# Patient Record
Sex: MALE | Race: BLACK OR AFRICAN AMERICAN
[De-identification: names, ages, dates, MRNs, and addresses within clinical notes are randomized per-mention and may not be internally consistent; named-entity substitution may affect disease eponyms.]

---

## 2017-02-16 ENCOUNTER — HOSPITAL ENCOUNTER (INPATIENT)
Dept: HOSPITAL 62 - ER | Age: 82
LOS: 5 days | Discharge: HOME HEALTH | DRG: 193 | End: 2017-02-21
Attending: INTERNAL MEDICINE | Admitting: INTERNAL MEDICINE
Payer: MEDICARE

## 2017-02-16 DIAGNOSIS — M54.5: ICD-10-CM

## 2017-02-16 DIAGNOSIS — K57.90: ICD-10-CM

## 2017-02-16 DIAGNOSIS — M85.88: ICD-10-CM

## 2017-02-16 DIAGNOSIS — Z79.899: ICD-10-CM

## 2017-02-16 DIAGNOSIS — Z87.891: ICD-10-CM

## 2017-02-16 DIAGNOSIS — J18.1: Primary | ICD-10-CM

## 2017-02-16 DIAGNOSIS — E78.5: ICD-10-CM

## 2017-02-16 DIAGNOSIS — I50.33: ICD-10-CM

## 2017-02-16 DIAGNOSIS — E86.0: ICD-10-CM

## 2017-02-16 DIAGNOSIS — J44.9: ICD-10-CM
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
## 2017-02-16 DIAGNOSIS — K21.0: ICD-10-CM

## 2017-02-16 DIAGNOSIS — I25.2: ICD-10-CM

## 2017-02-16 DIAGNOSIS — Z95.5: ICD-10-CM

## 2017-02-16 DIAGNOSIS — E11.9: ICD-10-CM

## 2017-02-16 DIAGNOSIS — R00.0: ICD-10-CM

## 2017-02-16 DIAGNOSIS — I45.81: ICD-10-CM

## 2017-02-16 DIAGNOSIS — Z95.1: ICD-10-CM

## 2017-02-16 DIAGNOSIS — I25.10: ICD-10-CM

## 2017-02-16 DIAGNOSIS — I45.10: ICD-10-CM

## 2017-02-16 DIAGNOSIS — E78.00: ICD-10-CM

## 2017-02-16 DIAGNOSIS — K59.00: ICD-10-CM

## 2017-02-16 DIAGNOSIS — N40.0: ICD-10-CM

## 2017-02-16 DIAGNOSIS — I10: ICD-10-CM

## 2017-02-16 LAB
APPEARANCE UR: (no result)
BILIRUB UR QL STRIP: NEGATIVE
GLUCOSE UR STRIP-MCNC: NEGATIVE MG/DL
KETONES UR STRIP-MCNC: 20 MG/DL
NITRITE UR QL STRIP: NEGATIVE
PH UR STRIP: 5 [PH] (ref 5–9)
PROT UR STRIP-MCNC: 100 MG/DL
SP GR UR STRIP: 1.02
UROBILINOGEN UR-MCNC: NEGATIVE MG/DL (ref ?–2)

## 2017-02-16 PROCEDURE — 96375 TX/PRO/DX INJ NEW DRUG ADDON: CPT

## 2017-02-16 PROCEDURE — 93306 TTE W/DOPPLER COMPLETE: CPT

## 2017-02-16 PROCEDURE — 93010 ELECTROCARDIOGRAM REPORT: CPT

## 2017-02-16 PROCEDURE — 71020: CPT

## 2017-02-16 PROCEDURE — 81001 URINALYSIS AUTO W/SCOPE: CPT

## 2017-02-16 PROCEDURE — 36415 COLL VENOUS BLD VENIPUNCTURE: CPT

## 2017-02-16 PROCEDURE — G0378 HOSPITAL OBSERVATION PER HR: HCPCS

## 2017-02-16 PROCEDURE — 96374 THER/PROPH/DIAG INJ IV PUSH: CPT

## 2017-02-16 PROCEDURE — 87070 CULTURE OTHR SPECIMN AEROBIC: CPT

## 2017-02-16 PROCEDURE — 99285 EMERGENCY DEPT VISIT HI MDM: CPT

## 2017-02-16 PROCEDURE — 87205 SMEAR GRAM STAIN: CPT

## 2017-02-16 PROCEDURE — 80053 COMPREHEN METABOLIC PANEL: CPT

## 2017-02-16 PROCEDURE — 94667 MNPJ CHEST WALL 1ST: CPT

## 2017-02-16 PROCEDURE — 83690 ASSAY OF LIPASE: CPT

## 2017-02-16 PROCEDURE — 85025 COMPLETE CBC W/AUTO DIFF WBC: CPT

## 2017-02-16 PROCEDURE — S0119 ONDANSETRON 4 MG: HCPCS

## 2017-02-16 PROCEDURE — 80061 LIPID PANEL: CPT

## 2017-02-16 PROCEDURE — 80048 BASIC METABOLIC PNL TOTAL CA: CPT

## 2017-02-16 PROCEDURE — 93005 ELECTROCARDIOGRAM TRACING: CPT

## 2017-02-16 PROCEDURE — 85027 COMPLETE CBC AUTOMATED: CPT

## 2017-02-16 PROCEDURE — 94668 MNPJ CHEST WALL SBSQ: CPT

## 2017-02-16 PROCEDURE — 74022 RADEX COMPL AQT ABD SERIES: CPT

## 2017-02-16 PROCEDURE — 94799 UNLISTED PULMONARY SVC/PX: CPT

## 2017-02-16 PROCEDURE — 72110 X-RAY EXAM L-2 SPINE 4/>VWS: CPT

## 2017-02-16 PROCEDURE — 83036 HEMOGLOBIN GLYCOSYLATED A1C: CPT

## 2017-02-16 PROCEDURE — 96361 HYDRATE IV INFUSION ADD-ON: CPT

## 2017-02-16 PROCEDURE — 82962 GLUCOSE BLOOD TEST: CPT

## 2017-02-16 NOTE — ER DOCUMENT REPORT
ED Fall





<PARKER BOSCH - Last Filed: 02/17/17 04:42>





- General


Time seen by provider: 20:05


Mode of Arrival: Medic


Information source: Patient, [facility] Records


TRAVEL OUTSIDE OF THE [place] IN LAST 30 DAYS: No





<YELENA PAUL - Last Filed: 02/18/17 20:47>





- General


Chief Complaint: Fall


Stated Complaint: BACK PAIN


Notes: 


This 87-year-old male patient comes emergency by EMS after suffering a fall at 

home.  He reports he was going to the bathroom to urinate, and before he was 

able to his left leg gave out and he fell backwards landing on his back.  He 

did not hit his head.  He states he is having pain to his mid low back at the 

waist level.  It is not very uncomfortable when he lays still, but it hurts to 

try to stand and walk. (YELENA PAUL)





- Related data


Allergies/Adverse Reactions: 


 





No Known Allergies Allergy (Verified 02/17/17 05:02)


 








Home Medications: 


 Current Home Medications





Atorvastatin Calcium [Lipitor 20 mg Tablet] 20 g PO DAILY 02/17/17 [History]


Cholecalciferol (Vitamin D3) [Vitamin D3 2000 unit Tablet] 2,000 unit PO DAILY 

02/17/17 [History]


Diltiazem HCl [Diltiazem 24Hr Cd] 120 mg PO DAILY 02/17/17 [History]


Dorzolamide HCl/Timolol Maleat [Dorzolamide-Timolol Eye Drops] 1 drop OP BID 02/ 17/17 [History]


Furosemide [Lasix] 20 mg PO DAILY 02/17/17 [History]


Omeprazole 20 mg PO DAILY 02/17/17 [History]


Potassium Chloride [K-Tab ER] 20 meq PO DAILY 02/17/17 [History]


Quinapril HCl [Accupril 20 mg Tablet] 20 mg PO DAILY 02/17/17 [History]


Sitagliptin Phosphate [Januvia] 100 mg PO DAILY 02/17/17 [History]











Past Medical History





- General


Information source: Patient, [facility] Records





- Social History


Smoking Status: Former Smoker


Cigarette use (# per day): No


Chew tobacco use (# tins/day): No


Smoking Education Provided: No


Frequency of alcohol use: None


Drug Abuse: None


Occupation: retired


Lives with: Family


Family History: Reviewed & Not Pertinent





- Past Medical History


Cardiac Medical History: Reports: Hx Coronary Artery Disease, Hx 

Hypercholesterolemia, Hx Hypertension


Pulmonary Medical History: Reports: None


EENT Medical History: Reports: None


Neurological Medical History: Reports: None


Endocrine Medical History: Reports: Hx Diabetes Mellitus Type 2


Renal/ Medical History: Reports: Hx Benign Prostatic Hyperplasia


GI Medical History: Reports: Other - Diverticulosis.  Episode of diverticular 

bleed in January 2016 at another facility, and after moving to this area he was 

seen on 08/30/2016 and was transfused for this diverticular bleed.


Musculoskeltal Medical History: Reports None


Psychiatric Medical History: Reports: None


Past Surgical History: Reports: Hx Cardiac Catheterization, Hx Coronary Artery 

Bypass Graft - 5 vessel CABG in 1983, Hx Coronary Stent - 4 stents in 2005





<YELENA PAUL - Last Filed: 02/18/17 20:47>





Review of Systems





- Review of Systems


Constitutional: No symptoms reported


EENT: No symptoms reported


Cardiovascular: No symptoms reported


Respiratory: No symptoms reported


Gastrointestinal: No symptoms reported


Genitourinary: No symptoms reported


Male Genitourinary: No symptoms reported


Musculoskeletal: No symptoms reported


Skin: No symptoms reported


Hematologic/Lymphatic: No symptoms reported


Neurological/Psychological: No symptoms reported





<YELENA PAUL - Last Filed: 02/18/17 20:47>





Physical Exam





- Vital signs


Interpretation: Normal





- General


General appearance: Appears well, Alert


In distress: None





- HEENT


Head: Normocephalic, Atraumatic


Eyes: Normal


Pupils: PERRL


Neck: Normal





- Respiratory


Respiratory status: No respiratory distress


Breath sounds: Normal





- Cardiovascular


Rhythm: Regular


Heart sounds: Normal auscultation


Murmur: No





- Abdominal


Inspection: Normal


Bowel sounds: Normal


Tenderness: Nontender





- Back


Back: Tender - Patient is a little tender to palpate over the spinous processes 

about the L5-S1 level





- Extremities


General upper extremity: Normal inspection


General lower extremity: Normal inspection





- Neurological


Neuro grossly intact: Yes





- Psychological


Associated symptoms: Normal affect, Normal mood





- Skin


Skin Temperature: Warm


Skin Moisture: Dry


Skin Color: Normal





<YELENA PAUL - Last Filed: 02/18/17 20:47>





- Vital signs


Vitals: 


 











Resp BP Pulse Ox


 


 22 H  169/95 H  95 


 


 02/16/17 20:08  02/16/17 20:08  02/16/17 20:08








 (PARKER BOSCH)


 (YELENA PAUL)





Course





- Laboratory


Result Diagrams: 


 02/17/17 01:45





 02/17/17 00:42





<PARKER BOSCH - Last Filed: 02/17/17 04:42>





- Laboratory


Result Diagrams: 


 02/18/17 06:03





 02/18/17 06:03





- Diagnostic Test


Radiology reviewed: Reports reviewed - Severe osteopenia, no obvious fracture 

seen.





- Transfer of Care


Care transferred to following provider: Dr. Bosch





<YELENA PAUL - Last Filed: 02/18/17 20:47>





- Re-evaluation


Re-evalutation: 


02/17/17 00:55


Lab still unable to get blood and IV attempts were also unsuccessful.  I 

therefore did a bedside ultrasound peripheral IV in the right antecubital area.

  I place a 20-gauge one and three-quarter inch IV under ultrasound guidance 

into vein of the right antecubital area.  This was obtained on the first stick.

  Patient tolerated procedure well without complications.  Patient had flash 

with dark nonpulsatile blood.  Blood was easily withdrawn from the IV.  IVs 

easily flushed with saline.  Patient will be given some IV fluids.  I will 

order some pain medicine because of his hip pain.  I've also ordered EKG.  

Pending labs and will see patient is asymptomatic improvement with medications 

given.  Will continue closely monitor his heart rate being that he is 

tachycardic.  Patient again says that he did not pass out.  Patient says he was 

going down the steps in his left leg gave out causing the fall.  





02/17/17 02:15


Patient's back pain is much improved after the morphine.  Despite resolution of 

his pain is still tachycardic.  He is still receiving IV fluids.  Despite him 

not having much abdominal pain I will obtain a acute abdominal series because 

his recurrent vomiting and age.





02/17/17 04:12


Patient continues to say he is pain-free.  His abdominal x-ray is not 

concerning.  I did repalpate his abdomen and it is nontender.  He still has 

some nausea.  He has not vomited any further.  I again asked him about any 

history of chest pain or difficulty breathing.  Patient absolutely denies any 

chest pain or difficulty breathing.  I put all his hips through full range of 

motion.  He does not have any pain in his hips.  He has no previous chest wall 

to palpation.  No pain in his neck.  I see no evidence other evidence of 

trauma.  He still has some tachycardia despite fluid resuscitation; however, it 

is improving.  His blood pressure is also improved.  At this time I will speak 

with Dr. Biswas, his primary care physician, for consideration for admission.





02/17/17 04:42


I did speak with Dr. Dumont.  At first resident think this was his patient.  I 

did go back and reconfirmed the patient's son that the patient is his patient.  

They last saw him in the office to 3 months ago.  I also looked at the 

medication bottles again and confirmed that the medications did have his name 

on them as a prescriber.  I informed Dr. Biswas that I feel the patient should 

be admitted due to recurrent vomiting and a non-gap metabolic acidosis and 

continue tachycardia.  Dr. Biswas eventually agreed to admit the patient. (

PARKER BOSCH)


02/16/17 23:04


The patient's x-rays do not show any fracture.  He remains a little tachycardic 

with a heart rate of 115 and blood pressure elevated 180 systolic.


Patient states he did take his blood pressure medications, but he thinks his 

pressure is high because he has not eaten today.  When asked why he had not 

eaten, he reveals that he been vomiting earlier today.


He also reports that he does have a walker at home, but was not using it when 

he went to the bathroom and his leg gave out and he fell.


About an hour after I initially saw the patient, he complained to the nurses 

about feeling a little nauseous and was given some Zofran.





Lab was unable to draw blood from the patient.  Will have the nurses attempt to 

get an IV for further workup of this gentleman.








 (YELENA PAUL)





- Vital Signs


Vital signs: 


 











Temp Pulse Resp BP Pulse Ox


 


 98.7 F   70   24 H  161/69 H  97 


 


 02/18/17 17:06  02/18/17 19:00  02/18/17 17:06  02/18/17 17:06  02/18/17 17:06








 (PARKER BOSCH)


 (YELENA PAUL)





- Laboratory


Laboratory results interpreted by me: 


 











  02/16/17 02/17/17 02/17/17





  21:31 00:42 00:42


 


WBC   


 


RBC   


 


Hgb   


 


Hct   


 


MCV   


 


MCH   


 


RDW   


 


Seg Neuts % (Manual)   


 


Band Neutrophils %   


 


Lymphocytes %   


 


Lymphocytes % (Manual)   


 


Monocytes %   


 


Abs Neuts (Manual)   


 


Absolute Monocytes   


 


Sodium   145.2 H 


 


Chloride   110 H 


 


Carbon Dioxide   18 L 


 


BUN   25 H 


 


Glucose   218 H 


 


POC Glucose   


 


Hemoglobin A1c %   


 


Total Protein   8.5 H 


 


Albumin   


 


Cholesterol   


 


Lipase    < 10.0 L


 


Urine Protein  100 H  


 


Urine Ketones  20 H  


 


Urine Blood  SMALL H  














  02/17/17 02/17/17 02/17/17





  01:45 08:25 08:25


 


WBC  11.6 H  13.5 H 


 


RBC   


 


Hgb  11.6 L  12.2 L 


 


Hct  35.3 L  37.1 L 


 


MCV  79 L  79 L 


 


MCH  26.0 L  26.0 L 


 


RDW  17.5 H  18.0 H 


 


Seg Neuts % (Manual)  88 H  


 


Band Neutrophils %  1 L  


 


Lymphocytes %   


 


Lymphocytes % (Manual)  5 L  


 


Monocytes %   


 


Abs Neuts (Manual)  10.3 H  


 


Absolute Monocytes   


 


Sodium   


 


Chloride    108 H


 


Carbon Dioxide    20 L


 


BUN    21 H


 


Glucose    170 H


 


POC Glucose   


 


Hemoglobin A1c %   


 


Total Protein    8.7 H


 


Albumin   


 


Cholesterol    201.22 H


 


Lipase   


 


Urine Protein   


 


Urine Ketones   


 


Urine Blood   














  02/17/17 02/17/17 02/17/17





  08:25 13:08 17:40


 


WBC   


 


RBC   


 


Hgb   


 


Hct   


 


MCV   


 


MCH   


 


RDW   


 


Seg Neuts % (Manual)   


 


Band Neutrophils %   


 


Lymphocytes %   


 


Lymphocytes % (Manual)   


 


Monocytes %   


 


Abs Neuts (Manual)   


 


Absolute Monocytes   


 


Sodium   


 


Chloride   


 


Carbon Dioxide   


 


BUN   


 


Glucose   


 


POC Glucose   186 H  199 H


 


Hemoglobin A1c %  7.6 H  


 


Total Protein   


 


Albumin   


 


Cholesterol   


 


Lipase   


 


Urine Protein   


 


Urine Ketones   


 


Urine Blood   














  02/17/17 02/18/17 02/18/17





  22:46 06:02 06:03


 


WBC    10.8 H


 


RBC    4.19 L


 


Hgb    11.2 L


 


Hct    32.8 L


 


MCV    78 L


 


MCH    26.7 L


 


RDW    17.8 H


 


Seg Neuts % (Manual)   


 


Band Neutrophils %   


 


Lymphocytes %    10.0 L


 


Lymphocytes % (Manual)   


 


Monocytes %    14.5 H


 


Abs Neuts (Manual)   


 


Absolute Monocytes    1.6 H


 


Sodium   


 


Chloride   


 


Carbon Dioxide   


 


BUN   


 


Glucose   


 


POC Glucose  203 H  114 H 


 


Hemoglobin A1c %   


 


Total Protein   


 


Albumin   


 


Cholesterol   


 


Lipase   


 


Urine Protein   


 


Urine Ketones   


 


Urine Blood   














  02/18/17 02/18/17 02/18/17





  06:03 12:11 16:53


 


WBC   


 


RBC   


 


Hgb   


 


Hct   


 


MCV   


 


MCH   


 


RDW   


 


Seg Neuts % (Manual)   


 


Band Neutrophils %   


 


Lymphocytes %   


 


Lymphocytes % (Manual)   


 


Monocytes %   


 


Abs Neuts (Manual)   


 


Absolute Monocytes   


 


Sodium   


 


Chloride  111 H  


 


Carbon Dioxide   


 


BUN   


 


Glucose  116 H  


 


POC Glucose   165 H  119 H


 


Hemoglobin A1c %   


 


Total Protein   


 


Albumin  3.3 L  


 


Cholesterol   


 


Lipase   


 


Urine Protein   


 


Urine Ketones   


 


Urine Blood   








 (PARKER BOSCH)





- EKG Interpretation by Me


Additional EKG results interpreted by me: 





02/17/17 01:28


EKG is reviewed and interpreted by me.  EKG shows sinus tachycardia with a rate 

of 150s to be sprue blue.  No concerning ST segment changes in comparison to 

his old EKG from 08/30/2016.  Patient does have a right bundle branch block 

which again is unchanged comparison to his previous EKG.  TX interval is within 

normal range.  QRS duration QTC intervals are prolonged. (PARKER BOSCH)





- Transfer of Care


Notes: 





02/16/17 23:40


Patient care is turned over to Dr. Bosch while the nurses are trying to get 

an IV and get blood work on this patient to workup his nausea vomiting 

hypertension and tachycardia. (YELENA PAUL)





Discharge





- Discharge


Admitting Provider: [facility]


Unit Admitted: Telemetry





<PARKER BOSCH - Last Filed: 02/17/17 04:42>





<YELENA PAUL - Last Filed: 02/18/17 20:47>





- Discharge


Clinical Impression: 


Fall


Qualifiers:


 Encounter type: initial encounter Qualified Code(s): W19.XXXA - Unspecified 

fall, initial encounter





Low back pain


Qualifiers:


 Chronicity: acute Back pain laterality: midline Sciatica presence: without 

sciatica Qualified Code(s): M54.5 - Low back pain





Nausea and vomiting


Qualifiers:


 Vomiting type: unspecified Vomiting Intractability: non-intractable Qualified 

Code(s): R11.2 - Nausea with vomiting, unspecified





Condition: Stable


Disposition: ADMITTED AS OBSERVATION

## 2017-02-17 LAB
%HYPO/RBC NFR BLD AUTO: SLIGHT %
ALBUMIN SERPL-MCNC: 3.8 G/DL (ref 3.5–5)
ALBUMIN SERPL-MCNC: 4.3 G/DL (ref 3.5–5)
ALP SERPL-CCNC: 104 U/L (ref 38–126)
ALP SERPL-CCNC: 89 U/L (ref 38–126)
ALT SERPL-CCNC: 30 U/L (ref 21–72)
ALT SERPL-CCNC: 38 U/L (ref 21–72)
ANION GAP SERPL CALC-SCNC: 15 MMOL/L (ref 5–19)
ANION GAP SERPL CALC-SCNC: 17 MMOL/L (ref 5–19)
ANISOCYTOSIS BLD QL SMEAR: (no result)
AST SERPL-CCNC: 38 U/L (ref 17–59)
AST SERPL-CCNC: 40 U/L (ref 17–59)
BASOPHILS NFR BLD MANUAL: 0 % (ref 0–2)
BILIRUB DIRECT SERPL-MCNC: 0 MG/DL (ref 0–0.3)
BILIRUB DIRECT SERPL-MCNC: 0 MG/DL (ref 0–0.3)
BILIRUB SERPL-MCNC: 0.8 MG/DL (ref 0.2–1.3)
BILIRUB SERPL-MCNC: 0.8 MG/DL (ref 0.2–1.3)
BUN SERPL-MCNC: 21 MG/DL (ref 7–20)
BUN SERPL-MCNC: 25 MG/DL (ref 7–20)
CALCIUM: 10 MG/DL (ref 8.4–10.2)
CALCIUM: 9.5 MG/DL (ref 8.4–10.2)
CHLORIDE SERPL-SCNC: 108 MMOL/L (ref 98–107)
CHLORIDE SERPL-SCNC: 110 MMOL/L (ref 98–107)
CHOLEST SERPL-MCNC: 201.22 MG/DL (ref 0–200)
CO2 SERPL-SCNC: 18 MMOL/L (ref 22–30)
CO2 SERPL-SCNC: 20 MMOL/L (ref 22–30)
CREAT SERPL-MCNC: 0.79 MG/DL (ref 0.52–1.25)
CREAT SERPL-MCNC: 0.85 MG/DL (ref 0.52–1.25)
DIRECT HDL: 67 MG/DL (ref 40–?)
EOSINOPHIL NFR BLD MANUAL: 1 % (ref 0–6)
ERYTHROCYTE [DISTWIDTH] IN BLOOD BY AUTOMATED COUNT: 17.5 % (ref 11.5–14)
ERYTHROCYTE [DISTWIDTH] IN BLOOD BY AUTOMATED COUNT: 18 % (ref 11.5–14)
GLUCOSE SERPL-MCNC: 170 MG/DL (ref 75–110)
GLUCOSE SERPL-MCNC: 218 MG/DL (ref 75–110)
HCT VFR BLD CALC: 35.3 % (ref 37.9–51)
HCT VFR BLD CALC: 37.1 % (ref 37.9–51)
HGB BLD-MCNC: 11.6 G/DL (ref 13.5–17)
HGB BLD-MCNC: 12.2 G/DL (ref 13.5–17)
HGB HCT DIFFERENCE: -0.5
HGB HCT DIFFERENCE: -0.5
LDLC SERPL DIRECT ASSAY-MCNC: 86 MG/DL (ref ?–100)
MCH RBC QN AUTO: 26 PG (ref 27–33.4)
MCH RBC QN AUTO: 26 PG (ref 27–33.4)
MCHC RBC AUTO-ENTMCNC: 32.8 G/DL (ref 32–36)
MCHC RBC AUTO-ENTMCNC: 32.9 G/DL (ref 32–36)
MCV RBC AUTO: 79 FL (ref 80–97)
MCV RBC AUTO: 79 FL (ref 80–97)
MICROCYTES BLD QL SMEAR: SLIGHT
NEUTS BAND NFR BLD MANUAL: 1 % (ref 3–5)
POTASSIUM SERPL-SCNC: 4.4 MMOL/L (ref 3.6–5)
POTASSIUM SERPL-SCNC: 4.4 MMOL/L (ref 3.6–5)
PROT SERPL-MCNC: 8.5 G/DL (ref 6.3–8.2)
PROT SERPL-MCNC: 8.7 G/DL (ref 6.3–8.2)
RBC # BLD AUTO: 4.44 10^6/UL (ref 4.35–5.55)
RBC # BLD AUTO: 4.7 10^6/UL (ref 4.35–5.55)
SODIUM SERPL-SCNC: 142.8 MMOL/L (ref 137–145)
SODIUM SERPL-SCNC: 145.2 MMOL/L (ref 137–145)
TARGETS BLD QL SMEAR: SLIGHT
TOTAL CELLS COUNTED BLD: 100
TOXIC GRANULES BLD QL SMEAR: SLIGHT
TRIGL SERPL-MCNC: 54 MG/DL (ref ?–150)
VARIANT LYMPHS NFR BLD MANUAL: 5 % (ref 13–45)
VLDLC SERPL CALC-MCNC: 11 MG/DL (ref 10–31)
WBC # BLD AUTO: 11.6 10^3/UL (ref 4–10.5)
WBC # BLD AUTO: 13.5 10^3/UL (ref 4–10.5)

## 2017-02-17 RX ADMIN — SITAGLIPTIN SCH MG: 50 TABLET, FILM COATED ORAL at 09:27

## 2017-02-17 RX ADMIN — DORZOLAMIDE HYDROCHLORIDE AND TIMOLOL MALEATE SCH DROP: 20; 5 SOLUTION OPHTHALMIC at 21:31

## 2017-02-17 RX ADMIN — ENALAPRIL MALEATE SCH MG: 10 TABLET ORAL at 09:27

## 2017-02-17 RX ADMIN — POTASSIUM CHLORIDE SCH MEQ: 750 TABLET, FILM COATED, EXTENDED RELEASE ORAL at 09:28

## 2017-02-17 RX ADMIN — DORZOLAMIDE HYDROCHLORIDE AND TIMOLOL MALEATE SCH DROP: 20; 5 SOLUTION OPHTHALMIC at 19:17

## 2017-02-17 RX ADMIN — ENOXAPARIN SODIUM SCH MG: 40 INJECTION SUBCUTANEOUS at 09:28

## 2017-02-17 RX ADMIN — DILTIAZEM HYDROCHLORIDE SCH MG: 120 CAPSULE, COATED, EXTENDED RELEASE ORAL at 09:28

## 2017-02-17 RX ADMIN — LANSOPRAZOLE SCH MG: 30 TABLET, ORALLY DISINTEGRATING, DELAYED RELEASE ORAL at 09:28

## 2017-02-17 RX ADMIN — ATORVASTATIN CALCIUM SCH MG: 20 TABLET, FILM COATED ORAL at 21:31

## 2017-02-17 NOTE — EKG REPORT
SEVERITY:- ABNORMAL ECG -

SINUS TACHYCARDIA

RIGHT BUNDLE BRANCH BLOCK

:

Confirmed by: Armond Lu 17-Feb-2017 12:35:38

## 2017-02-17 NOTE — PDOC H&P
History of Present Illness


Admission Date/PCP: 


  02/17/17 06:49





  ERIKA JULES





Patient complains of: Hx of fall; Nausea/Vomiting with dehydration.


History of Present Illness: 


PAIGE RODRIGUEZ is a 87 year old male


87 yr old man with hx of DM2/HTN/Hyperlipidemia/CAD s.p CABG/stent/CHF/GERD/

Constipation/Back pain who presented to ER earlier on account of mechanical 

fall and was seen and had xray L/S spine that was normal and was sent home. He 

later started having nausea/vomiting and was brought back; denied  headache, 

seizures, blurring of vision, chest pain, dyspnea, diarrhea, abd. pain, fever.





Past Medical History


Cardiac Medical History: Reports: Coronary Artery Disease, Myocardial Infarction

, Hyperlipidema, Hypertension


Pulmonary Medical History: Reports: None


EENT Medical History: Reports: None


Neurological Medical History: Reports: None


Endocrine Medical History: Reports: Diabetes Mellitus Type 2


GI Medical History: Reports: Other - Diverticulosis.  Episode of diverticular 

bleed in January 2016 at another facility, and after moving to this area he was 

seen on 08/30/2016 and was transfused for this diverticular bleed.


Musculoskeltal Medical History: Reports: None


Psychiatric Medical History: Reports: None


   Denies: Depression





Past Surgical History


Past Surgical History: Reports: Cardiac Catheterization, Coronary Artery Bypass 

Graft - 5 vessel CABG in 1983, Coronary Stent - 4 stents in 2005





Social History


Lives with: Family


Smoking Status: Former Smoker


Frequency of Alcohol Use: None


Hx Recreational Drug Use: No


Drugs: None


Hx Prescription Drug Abuse: No





- Advance Directive


Resuscitation Status: Full Code





Family History


Family History: Reviewed & Not Pertinent


Parental Family History Reviewed: Yes


Children Family History Reviewed: Yes


Sibling(s) Family History Reviewed.: Yes





Medication/Allergy


Home Medications: 








Atorvastatin Calcium 20 mg PO DAILY 08/31/16 


Diltiazem HCl [Cardizem Cd 120 mg Capsule] 1 cap.sr PO DAILY 08/31/16 


Docusate Sodium 100 mg PO DAILY 08/31/16 


Quinapril HCl [Accupril 20 mg Tablet] 20 mg PO DAILY 08/31/16 


Dorzolamide HCl/Timolol Maleat [Cosopt Eye Drops] 1 drop OU Q12H 09/01/16 


Lansoprazole [Prevacid 30 mg Odt Tablet] 30 mg PO BID@0600,1700 #60 tabKrystinarap 

09/02/16 


Potassium Chloride 20 meq PO DAILY #30 tab.er.prt 09/02/16 


Linaclotide [Linzess] 290 mcg PO DAILY 02/17/17 


Sitagliptin Phosphate [Januvia] 100 mg PO DAILY 02/17/17 








Allergies/Adverse Reactions: 


 





No Known Allergies Allergy (Verified 02/17/17 05:02)


 











Review of Systems


All systems: as per PMH


Eyes: PRESENT: as per HPI


Ears: PRESENT: as per HPI


Nose, Mouth, and Throat: PRESENT: as per HPI


Cardiovascular: PRESENT: as per HPI


Gastrointestinal: PRESENT: nausea, vomiting


Genitourinary: PRESENT: as per HPI


Musculoskeletal: PRESENT: as per HPI


Neurological: PRESENT: as per HPI


Psychiatric: PRESENT: as per HPI


Hematologic/Lymphatic: PRESENT: as per HPI


Allergic/Immunologic: PRESENT: as per HPI





Physical Exam


Vital Signs: 


 











Temp Pulse Resp BP Pulse Ox


 


 99.9 F   107 H  23 H  157/73 H  97 


 


 02/17/17 06:48  02/17/17 07:46  02/17/17 06:48  02/17/17 06:48  02/17/17 06:48











General appearance: PRESENT: no acute distress, well-developed, well-nourished


Head exam: PRESENT: atraumatic, normocephalic, other


Eye exam: PRESENT: EOMI, PERRLA


Ear exam: PRESENT: normal external ear exam, TM's normal bilaterally


Mouth exam: PRESENT: moist, neck supple, tongue midline


Respiratory exam: PRESENT: clear to auscultation jr, symmetrical


Cardiovascular exam: PRESENT: +S1, +S2


Pulses: PRESENT: +2 pedal pulses bilateral


GI/Abdominal exam: PRESENT: normal bowel sounds, soft


Rectal exam: PRESENT: deferred


Extremities exam: PRESENT: full ROM


Neurological exam: PRESENT: alert, oriented to person, oriented to place, 

oriented to time


Psychiatric exam: PRESENT: normal mood





Results


Laboratory Results: 


 





 02/17/17 08:25 





 











  02/17/17





  08:25


 


WBC  13.5 H


 


RBC  4.70


 


Hgb  12.2 L


 


Hct  37.1 L


 


MCV  79 L


 


MCH  26.0 L


 


MCHC  32.9


 


RDW  18.0 H


 


Plt Count  193











Impressions: 


 





Lumbar Spine X-Ray  02/16/17 20:21


IMPRESSION:  Limiting osteopenia.  No gross fracture or spinal malalignment.


 








Acute Abdomen Series  02/17/17 02:13


IMPRESSION:  Nonspecific bowel gas pattern.


Mild vascular congestion.  Mild bibasilar atelectasis.  Small left pleural 

effusion.


 














Assessment & Plan





- Diagnosis


(1) Vomiting





Is this a current diagnosis for this admission?: YesPlan: 


Ct with IVF  normal saline cautiously at 75 cc/hr due to hx of CHF. Ct with 

Zofran 4 mg q6h prn IV. Monitor him closely; 4 hourly neuro checks; seizure and 

fall precautions.








(2) Diabetes mellitus type 2 in nonobese


Is this a current diagnosis for this admission?: YesPlan: 


Ct with IV fluids; Ct with  Januvia 100 mg daily po; accucheck qac, qhs ; 

slidding scale with humalog insulin as per [facility] protocol; 1800 caloris ADA diet. 

F/u HBA1C.








(3) Hypertension





Is this a current diagnosis for this admission?: YesPlan: 


Ct with Quinapril 20 mg daily po; Cardizem 120 mg daily po; 2 g sodium diet.








(4) Hyperlipidemia





Is this a current diagnosis for this admission?: YesPlan: 


Ct with Atorvastatin 20 mg qhs po; 200 mg cholesterol diet.








(5) Congestive heart failure (CHF)





Is this a current diagnosis for this admission?: YesPlan: 


Hold Lasix 20 mg qd due to dehydration; ct with KCL 20 mEQ daily po; fluid input

/out put chart; daily wt; monitor chemistries daily.








(6) CAD (coronary artery disease) of bypass graft





Is this a current diagnosis for this admission?: YesPlan: 


CT with Aspirin 81 mg daily po; 2 G sodium, low cholesterol diet.








(7) Reflux esophagitis


Is this a current diagnosis for this admission?: YesPlan: 


Ct with Prevacid 30 mg daily po.








(8) Constipation





Is this a current diagnosis for this admission?: YesPlan: 


Ct with Linzess 290 mg daily po; Colace 100 mg daily po.








(9) DVT prophylaxis


Is this a current diagnosis for this admission?: YesPlan: 


Ct with Lovenox 40 mg daily subcut; SCD.











- Time


Time Spent: 30 to 50 Minutes


Medications reviewed and adjusted accordingly: Yes


Within: within 24 hours





- Inpatient Certification


Medical Necessity: Failure to Improve With Outpatient Therapy, Need For IV 

Fluids, Need for Pain Control

## 2017-02-18 LAB
ALBUMIN SERPL-MCNC: 3.3 G/DL (ref 3.5–5)
ALP SERPL-CCNC: 90 U/L (ref 38–126)
ALT SERPL-CCNC: 28 U/L (ref 21–72)
ANION GAP SERPL CALC-SCNC: 9 MMOL/L (ref 5–19)
AST SERPL-CCNC: 26 U/L (ref 17–59)
BASOPHILS # BLD AUTO: 0 10^3/UL (ref 0–0.2)
BASOPHILS NFR BLD AUTO: 0.2 % (ref 0–2)
BILIRUB DIRECT SERPL-MCNC: 0 MG/DL (ref 0–0.3)
BILIRUB SERPL-MCNC: 0.5 MG/DL (ref 0.2–1.3)
BUN SERPL-MCNC: 18 MG/DL (ref 7–20)
CALCIUM: 8.9 MG/DL (ref 8.4–10.2)
CHLORIDE SERPL-SCNC: 111 MMOL/L (ref 98–107)
CO2 SERPL-SCNC: 23 MMOL/L (ref 22–30)
CREAT SERPL-MCNC: 0.85 MG/DL (ref 0.52–1.25)
EOSINOPHIL # BLD AUTO: 0 10^3/UL (ref 0–0.6)
EOSINOPHIL NFR BLD AUTO: 0.1 % (ref 0–6)
ERYTHROCYTE [DISTWIDTH] IN BLOOD BY AUTOMATED COUNT: 17.8 % (ref 11.5–14)
GLUCOSE SERPL-MCNC: 116 MG/DL (ref 75–110)
HCT VFR BLD CALC: 32.8 % (ref 37.9–51)
HGB BLD-MCNC: 11.2 G/DL (ref 13.5–17)
HGB HCT DIFFERENCE: 0.8
LYMPHOCYTES # BLD AUTO: 1.1 10^3/UL (ref 0.5–4.7)
LYMPHOCYTES NFR BLD AUTO: 10 % (ref 13–45)
MCH RBC QN AUTO: 26.7 PG (ref 27–33.4)
MCHC RBC AUTO-ENTMCNC: 34.2 G/DL (ref 32–36)
MCV RBC AUTO: 78 FL (ref 80–97)
MONOCYTES # BLD AUTO: 1.6 10^3/UL (ref 0.1–1.4)
MONOCYTES NFR BLD AUTO: 14.5 % (ref 3–13)
NEUTROPHILS # BLD AUTO: 8.1 10^3/UL (ref 1.7–8.2)
NEUTS SEG NFR BLD AUTO: 75.2 % (ref 42–78)
POTASSIUM SERPL-SCNC: 4.1 MMOL/L (ref 3.6–5)
PROT SERPL-MCNC: 6.8 G/DL (ref 6.3–8.2)
RBC # BLD AUTO: 4.19 10^6/UL (ref 4.35–5.55)
SODIUM SERPL-SCNC: 143.1 MMOL/L (ref 137–145)
WBC # BLD AUTO: 10.8 10^3/UL (ref 4–10.5)

## 2017-02-18 RX ADMIN — INSULIN LISPRO PRN UNIT: 100 INJECTION, SOLUTION INTRAVENOUS; SUBCUTANEOUS at 12:15

## 2017-02-18 RX ADMIN — DORZOLAMIDE HYDROCHLORIDE AND TIMOLOL MALEATE SCH DROP: 20; 5 SOLUTION OPHTHALMIC at 09:13

## 2017-02-18 RX ADMIN — DORZOLAMIDE HYDROCHLORIDE AND TIMOLOL MALEATE SCH DROP: 20; 5 SOLUTION OPHTHALMIC at 21:42

## 2017-02-18 RX ADMIN — AMOXICILLIN AND CLAVULANATE POTASSIUM SCH TAB: 500; 125 TABLET, FILM COATED ORAL at 21:42

## 2017-02-18 RX ADMIN — SITAGLIPTIN SCH MG: 50 TABLET, FILM COATED ORAL at 09:13

## 2017-02-18 RX ADMIN — POTASSIUM CHLORIDE SCH MEQ: 750 TABLET, FILM COATED, EXTENDED RELEASE ORAL at 09:13

## 2017-02-18 RX ADMIN — LANSOPRAZOLE SCH MG: 30 TABLET, ORALLY DISINTEGRATING, DELAYED RELEASE ORAL at 09:13

## 2017-02-18 RX ADMIN — GUAIFENESIN SCH MG: 600 TABLET, EXTENDED RELEASE ORAL at 21:42

## 2017-02-18 RX ADMIN — ATORVASTATIN CALCIUM SCH MG: 20 TABLET, FILM COATED ORAL at 21:42

## 2017-02-18 RX ADMIN — ENOXAPARIN SODIUM SCH MG: 40 INJECTION SUBCUTANEOUS at 09:13

## 2017-02-18 RX ADMIN — ENALAPRIL MALEATE SCH MG: 10 TABLET ORAL at 09:12

## 2017-02-18 RX ADMIN — DILTIAZEM HYDROCHLORIDE SCH MG: 120 CAPSULE, COATED, EXTENDED RELEASE ORAL at 09:13

## 2017-02-18 NOTE — PDOC PROGRESS REPORT
Subjective


Progress Note for:: 02/18/17


Subjective:: 


Patient reports he's feeling better.  He does express a desire to go home.  He 

admits shortness of breath.  MAXIMUM TEMPERATURE the last 24 hours has been 

99.9.  Patient denies chest pain, abdominal pain, nausea, vomiting, fevers, 

chills, diarrhea, constipation, headache, new onset weakness.





Physical Exam


Vital Signs: 


 











Temp Pulse Resp BP Pulse Ox


 


 99.3 F   76   21 H  153/66 H  96 


 


 02/17/17 20:12  02/18/17 07:00  02/17/17 20:12  02/17/17 20:12  02/17/17 20:12








 Intake & Output











 02/17/17 02/18/17 02/19/17





 06:59 06:59 06:59


 


Intake Total  1580 


 


Output Total  200 


 


Balance  1380 











Exam: 


General: Awake alert and answers questions appropriately, mild respiratory 

distress, tachypnea, frail-appearing


HEENT: AT/NC, PERRL, EOMI, oropharynx is moist, pink, no scleral icterus, no 

conjunctival injection


Neck: + JVD, trachea midline


Chest: Left lower lobe rhonchi, scattered rales


CV: Regular rate and rhythm, normal S1 and S2, no rub or gallop


Abdomen: Soft, nontender to palpation, nondistended, hypoactive bowel sounds; 

no rebound, rigidity, or guarding


Extremities: No cyanosis, clubbing; 2+edema


Neuro: Cranial nerves II through XII are grossly intact without focal deficits


Psych: Normal mood and affect





Results


Laboratory Results: 


 





 02/18/17 06:03 





 02/18/17 06:03 





 











  02/17/17 02/17/17 02/18/17





  08:25 08:25 06:03


 


WBC  13.5 H   10.8 H


 


RBC  4.70   4.19 L


 


Hgb  12.2 L   11.2 L


 


Hct  37.1 L   32.8 L


 


MCV  79 L   78 L


 


MCH  26.0 L   26.7 L


 


MCHC  32.9   34.2


 


RDW  18.0 H   17.8 H


 


Plt Count  193   191


 


Seg Neutrophils %    75.2


 


Lymphocytes %    10.0 L


 


Monocytes %    14.5 H


 


Eosinophils %    0.1


 


Basophils %    0.2


 


Absolute Neutrophils    8.1


 


Absolute Lymphocytes    1.1


 


Absolute Monocytes    1.6 H


 


Absolute Eosinophils    0.0


 


Absolute Basophils    0.0


 


Sodium   142.8 


 


Potassium   4.4 


 


Chloride   108 H 


 


Carbon Dioxide   20 L 


 


Anion Gap   15 


 


BUN   21 H 


 


Creatinine   0.79 


 


Est GFR ( Amer)   > 60 


 


Est GFR (Non-Af Amer)   > 60 


 


Glucose   170 H 


 


Calcium   9.5 


 


Total Bilirubin   0.8 


 


AST   38 


 


ALT   38 


 


Alkaline Phosphatase   89 


 


Total Protein   8.7 H 


 


Albumin   3.8 


 


Triglycerides   54 


 


Cholesterol   201.22 H 


 


LDL Cholesterol Direct   86 


 


VLDL Cholesterol   11.0 


 


HDL Cholesterol   67 














  02/18/17





  06:03


 


WBC 


 


RBC 


 


Hgb 


 


Hct 


 


MCV 


 


MCH 


 


MCHC 


 


RDW 


 


Plt Count 


 


Seg Neutrophils % 


 


Lymphocytes % 


 


Monocytes % 


 


Eosinophils % 


 


Basophils % 


 


Absolute Neutrophils 


 


Absolute Lymphocytes 


 


Absolute Monocytes 


 


Absolute Eosinophils 


 


Absolute Basophils 


 


Sodium  143.1


 


Potassium  4.1


 


Chloride  111 H


 


Carbon Dioxide  23


 


Anion Gap  9


 


BUN  18


 


Creatinine  0.85


 


Est GFR ( Amer)  > 60


 


Est GFR (Non-Af Amer)  > 60


 


Glucose  116 H


 


Calcium  8.9


 


Total Bilirubin  0.5


 


AST  26


 


ALT  28


 


Alkaline Phosphatase  90


 


Total Protein  6.8


 


Albumin  3.3 L


 


Triglycerides 


 


Cholesterol 


 


LDL Cholesterol Direct 


 


VLDL Cholesterol 


 


HDL Cholesterol 











Impressions: 


 





Lumbar Spine X-Ray  02/16/17 20:21


IMPRESSION:  Limiting osteopenia.  No gross fracture or spinal malalignment.


 








Acute Abdomen Series  02/17/17 02:13


IMPRESSION:  Nonspecific bowel gas pattern.


Mild vascular congestion.  Mild bibasilar atelectasis.  Small left pleural 

effusion.


 














Assessment & Plan





- Diagnosis


(1) Pneumonia


Qualifiers: 


     Pneumonia type: due to unspecified organism     Laterality: left     Lung 

location: lower lobe of lung        Qualified Code(s): J18.1 - Lobar pneumonia, 

unspecified organism  


Is this a current diagnosis for this admission?: YesPlan: 





We'll initiate patient on Augmentin and azithromycin for probable community-

acquired pneumonia.  Begin scheduled nebulized treatments and Mucinex.  Sputum 

culture pending








(2) Acute congestive heart failure


Qualifiers: 


     Congestive heart failure type: unspecified congestive heart failure type  

      Qualified Code(s): I50.9 - Heart failure, unspecified  


Is this a current diagnosis for this admission?: YesPlan: 


If patient has not had an echo in the past 12 months we will obtain a new echo.


We'll give patient 20 mg of IV Lasix.  He does have a history of coronary 

artery disease and obvious previous bypass graft; however, patient has been 

iatrogenically volume overloaded.








(3) CAD (coronary artery disease) of bypass graft





Is this a current diagnosis for this admission?: Yes





(4) Diabetes mellitus type 2 in nonobese


Is this a current diagnosis for this admission?: Yes





(5) Hypertension





Is this a current diagnosis for this admission?: YesPlan: 


Will add nitro paste blood pressure remains poorly controlled.








(6) Full code status


Is this a current diagnosis for this admission?: YesPlan: 


Patient is a full code and his son Humberto is his surrogate decision maker.  This 

current plan of care was discussed with his family who is in agreement.











- Time


Time Spent with patient: 35 or more minutes


Medications reviewed and adjusted accordingly: Yes





- Inpatient Certification


Based on my medical assessment, after consideration of the patient's 

comorbidities, presenting symptoms, or acuity I expect that the services needed 

warrant INPATIENT care.: Yes


I certify that my determination is in accordance with my understanding of 

Medicare's requirements for reasonable and necessary INPATIENT services [42 CFR 

412.3e].: Yes


Medical Necessity: Failure to Improve With Outpatient Therapy, Significant 

Comorbidiites Make Outpatient Treatment Too Risky, Risk of Diagnosis Which Will 

Require Inpatient Eval/Care/Monitoring


Post Hospital Care: D/C Planner Documentation

## 2017-02-19 LAB
ANION GAP SERPL CALC-SCNC: 9 MMOL/L (ref 5–19)
BASOPHILS # BLD AUTO: 0 10^3/UL (ref 0–0.2)
BASOPHILS NFR BLD AUTO: 0.2 % (ref 0–2)
BUN SERPL-MCNC: 18 MG/DL (ref 7–20)
CALCIUM: 8.7 MG/DL (ref 8.4–10.2)
CHLORIDE SERPL-SCNC: 107 MMOL/L (ref 98–107)
CO2 SERPL-SCNC: 24 MMOL/L (ref 22–30)
CREAT SERPL-MCNC: 0.83 MG/DL (ref 0.52–1.25)
EOSINOPHIL # BLD AUTO: 0 10^3/UL (ref 0–0.6)
EOSINOPHIL NFR BLD AUTO: 0.5 % (ref 0–6)
ERYTHROCYTE [DISTWIDTH] IN BLOOD BY AUTOMATED COUNT: 17.2 % (ref 11.5–14)
GLUCOSE SERPL-MCNC: 129 MG/DL (ref 75–110)
HCT VFR BLD CALC: 32.7 % (ref 37.9–51)
HGB BLD-MCNC: 11.1 G/DL (ref 13.5–17)
HGB HCT DIFFERENCE: 0.6
LYMPHOCYTES # BLD AUTO: 1.2 10^3/UL (ref 0.5–4.7)
LYMPHOCYTES NFR BLD AUTO: 13.3 % (ref 13–45)
MCH RBC QN AUTO: 26.5 PG (ref 27–33.4)
MCHC RBC AUTO-ENTMCNC: 33.8 G/DL (ref 32–36)
MCV RBC AUTO: 78 FL (ref 80–97)
MONOCYTES # BLD AUTO: 1.4 10^3/UL (ref 0.1–1.4)
MONOCYTES NFR BLD AUTO: 15.7 % (ref 3–13)
NEUTROPHILS # BLD AUTO: 6.4 10^3/UL (ref 1.7–8.2)
NEUTS SEG NFR BLD AUTO: 70.3 % (ref 42–78)
POTASSIUM SERPL-SCNC: 3.9 MMOL/L (ref 3.6–5)
RBC # BLD AUTO: 4.17 10^6/UL (ref 4.35–5.55)
SODIUM SERPL-SCNC: 139.6 MMOL/L (ref 137–145)
WBC # BLD AUTO: 9 10^3/UL (ref 4–10.5)

## 2017-02-19 RX ADMIN — GUAIFENESIN SCH MG: 600 TABLET, EXTENDED RELEASE ORAL at 10:29

## 2017-02-19 RX ADMIN — AMOXICILLIN AND CLAVULANATE POTASSIUM SCH TAB: 500; 125 TABLET, FILM COATED ORAL at 05:55

## 2017-02-19 RX ADMIN — DORZOLAMIDE HYDROCHLORIDE AND TIMOLOL MALEATE SCH DROP: 20; 5 SOLUTION OPHTHALMIC at 22:11

## 2017-02-19 RX ADMIN — SITAGLIPTIN SCH MG: 50 TABLET, FILM COATED ORAL at 10:28

## 2017-02-19 RX ADMIN — AMOXICILLIN AND CLAVULANATE POTASSIUM SCH TAB: 500; 125 TABLET, FILM COATED ORAL at 22:11

## 2017-02-19 RX ADMIN — DILTIAZEM HYDROCHLORIDE SCH MG: 120 CAPSULE, COATED, EXTENDED RELEASE ORAL at 10:28

## 2017-02-19 RX ADMIN — AMOXICILLIN AND CLAVULANATE POTASSIUM SCH TAB: 500; 125 TABLET, FILM COATED ORAL at 13:08

## 2017-02-19 RX ADMIN — ENOXAPARIN SODIUM SCH MG: 40 INJECTION SUBCUTANEOUS at 10:31

## 2017-02-19 RX ADMIN — DORZOLAMIDE HYDROCHLORIDE AND TIMOLOL MALEATE SCH DROP: 20; 5 SOLUTION OPHTHALMIC at 10:30

## 2017-02-19 RX ADMIN — LANSOPRAZOLE SCH MG: 30 TABLET, ORALLY DISINTEGRATING, DELAYED RELEASE ORAL at 10:28

## 2017-02-19 RX ADMIN — FUROSEMIDE SCH MG: 10 INJECTION, SOLUTION INTRAMUSCULAR; INTRAVENOUS at 10:30

## 2017-02-19 RX ADMIN — ENALAPRIL MALEATE SCH MG: 10 TABLET ORAL at 10:29

## 2017-02-19 RX ADMIN — POTASSIUM CHLORIDE SCH MEQ: 750 TABLET, FILM COATED, EXTENDED RELEASE ORAL at 10:29

## 2017-02-19 RX ADMIN — GUAIFENESIN SCH MG: 600 TABLET, EXTENDED RELEASE ORAL at 22:11

## 2017-02-19 RX ADMIN — ATORVASTATIN CALCIUM SCH MG: 20 TABLET, FILM COATED ORAL at 22:11

## 2017-02-19 NOTE — PDOC PROGRESS REPORT
Subjective


Progress Note for:: 02/19/17


Subjective:: 


Patient is now having cough productive of green-yellow sputum.  He reports he 

is breathing better than yesterday. Patient denies chest pain, shortness of 

breath, abdominal pain, nausea, vomiting, fevers, chills, diarrhea, constipation

, headache, new onset weakness.





Physical Exam


Vital Signs: 


 











Temp Pulse Resp BP Pulse Ox


 


 98.9 F   68   17   137/52 H  97 


 


 02/19/17 04:00  02/19/17 04:00  02/19/17 04:00  02/19/17 04:00  02/19/17 04:00








 Intake & Output











 02/18/17 02/19/17 02/20/17





 06:59 06:59 06:59


 


Weight  85.3 kg 











Exam: 


General: Awake alert and oriented 2, no acute respiratory distress, frail-

appearing


HEENT: AT/NC, PERRL, EOMI, oropharynx is moist, pink, no scleral icterus, no 

conjunctival injection


Neck: + JVD, trachea midline


Chest: Left lower lobe rhonchi, slight bilateral Rales


CV: Regular rate and rhythm, normal S1 and S2, no rub or gallop


Abdomen: Soft, nontender to palpation, nondistended, hypoactive bowel sounds; 

no rebound, rigidity, or guarding


Extremities: No cyanosis, clubbing; 1+edema


Neuro: Cranial nerves II through XII are grossly intact without focal deficits; 

awake alert and oriented 2


Psych: Normal mood and affect





Results


Impressions: 


 





Lumbar Spine X-Ray  02/16/17 20:21


IMPRESSION:  Limiting osteopenia.  No gross fracture or spinal malalignment.


 








Acute Abdomen Series  02/17/17 02:13


IMPRESSION:  Nonspecific bowel gas pattern.


Mild vascular congestion.  Mild bibasilar atelectasis.  Small left pleural 

effusion.


 








Chest X-Ray  02/18/17 00:00


IMPRESSION:  Cardiac enlargement with mild vascular congestion and interstitial 

edema.  Basilar pneumonia could be present, as above.


 














Assessment & Plan





- Diagnosis


(1) Pneumonia


Qualifiers: 


     Pneumonia type: due to unspecified organism     Laterality: left     Lung 

location: lower lobe of lung        Qualified Code(s): J18.1 - Lobar pneumonia, 

unspecified organism  


Is this a current diagnosis for this admission?: YesPlan: 








Initiate patient on Augmentin and azithromycin for probable community-acquired 

pneumonia.  Now on scheduled nebulized treatments and Mucinex.  Sputum culture 

pending.  Patient appears to be improved with this.








(2) Acute congestive heart failure


Qualifiers: 


     Congestive heart failure type: unspecified congestive heart failure type  

      Qualified Code(s): I50.9 - Heart failure, unspecified  


Is this a current diagnosis for this admission?: YesPlan: 








Have ordered new echo.  Suspect combined systolic and diastolic congestive 

heart failure.


Give patient additional 20 mg of IV Lasix.  He does have a history of coronary 

artery disease and obvious previous bypass graft; however, patient has been 

iatrogenically volume overloaded.  Judicious diuresis.


On Vasotec and Lasix.  Patient reports smoking history and likely has 

underlying COPD.  Will hold on beta blocker at this time and add as clinically 

appropriate.








(3) CAD (coronary artery disease) of bypass graft





Is this a current diagnosis for this admission?: Yes





(4) Diabetes mellitus type 2 in nonobese


Is this a current diagnosis for this admission?: YesPlan: 


Blood sugars currently well-controlled.








(5) Hypertension





Is this a current diagnosis for this admission?: Yes





(6) Full code status


Is this a current diagnosis for this admission?: Yes








- Time


Time Spent with patient: 25-34 minutes


Medications reviewed and adjusted accordingly: Yes

## 2017-02-20 LAB
ALBUMIN SERPL-MCNC: 3.6 G/DL (ref 3.5–5)
ALP SERPL-CCNC: 77 U/L (ref 38–126)
ALT SERPL-CCNC: 24 U/L (ref 21–72)
ANION GAP SERPL CALC-SCNC: 12 MMOL/L (ref 5–19)
AST SERPL-CCNC: 21 U/L (ref 17–59)
BASOPHILS # BLD AUTO: 0 10^3/UL (ref 0–0.2)
BASOPHILS NFR BLD AUTO: 0.3 % (ref 0–2)
BILIRUB DIRECT SERPL-MCNC: 0 MG/DL (ref 0–0.3)
BILIRUB SERPL-MCNC: 0.8 MG/DL (ref 0.2–1.3)
BUN SERPL-MCNC: 20 MG/DL (ref 7–20)
CALCIUM: 9.3 MG/DL (ref 8.4–10.2)
CHLORIDE SERPL-SCNC: 104 MMOL/L (ref 98–107)
CO2 SERPL-SCNC: 25 MMOL/L (ref 22–30)
CREAT SERPL-MCNC: 0.87 MG/DL (ref 0.52–1.25)
EOSINOPHIL # BLD AUTO: 0 10^3/UL (ref 0–0.6)
EOSINOPHIL NFR BLD AUTO: 0.7 % (ref 0–6)
ERYTHROCYTE [DISTWIDTH] IN BLOOD BY AUTOMATED COUNT: 17.2 % (ref 11.5–14)
GLUCOSE SERPL-MCNC: 156 MG/DL (ref 75–110)
HCT VFR BLD CALC: 36 % (ref 37.9–51)
HGB BLD-MCNC: 12.2 G/DL (ref 13.5–17)
HGB HCT DIFFERENCE: 0.6
LYMPHOCYTES # BLD AUTO: 1.1 10^3/UL (ref 0.5–4.7)
LYMPHOCYTES NFR BLD AUTO: 16.2 % (ref 13–45)
MCH RBC QN AUTO: 26.5 PG (ref 27–33.4)
MCHC RBC AUTO-ENTMCNC: 33.8 G/DL (ref 32–36)
MCV RBC AUTO: 79 FL (ref 80–97)
MONOCYTES # BLD AUTO: 0.7 10^3/UL (ref 0.1–1.4)
MONOCYTES NFR BLD AUTO: 10 % (ref 3–13)
NEUTROPHILS # BLD AUTO: 4.8 10^3/UL (ref 1.7–8.2)
NEUTS SEG NFR BLD AUTO: 72.8 % (ref 42–78)
POTASSIUM SERPL-SCNC: 3.9 MMOL/L (ref 3.6–5)
PROT SERPL-MCNC: 7.3 G/DL (ref 6.3–8.2)
RBC # BLD AUTO: 4.59 10^6/UL (ref 4.35–5.55)
SODIUM SERPL-SCNC: 140.5 MMOL/L (ref 137–145)
WBC # BLD AUTO: 6.5 10^3/UL (ref 4–10.5)

## 2017-02-20 RX ADMIN — DORZOLAMIDE HYDROCHLORIDE AND TIMOLOL MALEATE SCH DROP: 20; 5 SOLUTION OPHTHALMIC at 21:48

## 2017-02-20 RX ADMIN — SITAGLIPTIN SCH MG: 50 TABLET, FILM COATED ORAL at 11:27

## 2017-02-20 RX ADMIN — GUAIFENESIN SCH MG: 600 TABLET, EXTENDED RELEASE ORAL at 21:30

## 2017-02-20 RX ADMIN — GUAIFENESIN SCH MG: 600 TABLET, EXTENDED RELEASE ORAL at 11:27

## 2017-02-20 RX ADMIN — ATORVASTATIN CALCIUM SCH MG: 20 TABLET, FILM COATED ORAL at 21:30

## 2017-02-20 RX ADMIN — LANSOPRAZOLE SCH MG: 30 TABLET, ORALLY DISINTEGRATING, DELAYED RELEASE ORAL at 11:26

## 2017-02-20 RX ADMIN — AMOXICILLIN AND CLAVULANATE POTASSIUM SCH TAB: 500; 125 TABLET, FILM COATED ORAL at 21:30

## 2017-02-20 RX ADMIN — POTASSIUM CHLORIDE SCH MEQ: 750 TABLET, FILM COATED, EXTENDED RELEASE ORAL at 11:26

## 2017-02-20 RX ADMIN — INSULIN LISPRO PRN UNIT: 100 INJECTION, SOLUTION INTRAVENOUS; SUBCUTANEOUS at 12:16

## 2017-02-20 RX ADMIN — AMOXICILLIN AND CLAVULANATE POTASSIUM SCH TAB: 500; 125 TABLET, FILM COATED ORAL at 05:37

## 2017-02-20 RX ADMIN — DORZOLAMIDE HYDROCHLORIDE AND TIMOLOL MALEATE SCH DROP: 20; 5 SOLUTION OPHTHALMIC at 11:28

## 2017-02-20 RX ADMIN — DILTIAZEM HYDROCHLORIDE SCH MG: 120 CAPSULE, COATED, EXTENDED RELEASE ORAL at 11:27

## 2017-02-20 RX ADMIN — ENALAPRIL MALEATE SCH MG: 10 TABLET ORAL at 11:26

## 2017-02-20 RX ADMIN — ENOXAPARIN SODIUM SCH MG: 40 INJECTION SUBCUTANEOUS at 11:28

## 2017-02-20 RX ADMIN — AMOXICILLIN AND CLAVULANATE POTASSIUM SCH TAB: 500; 125 TABLET, FILM COATED ORAL at 14:03

## 2017-02-20 RX ADMIN — AZITHROMYCIN SCH MG: 250 TABLET, FILM COATED ORAL at 11:26

## 2017-02-20 RX ADMIN — FUROSEMIDE SCH MG: 10 INJECTION, SOLUTION INTRAMUSCULAR; INTRAVENOUS at 11:27

## 2017-02-20 NOTE — XCELERA REPORT
[address] [address]

                             Tel: [phone]

                             Fax: [phone]



                    Transthoracic Echocardiogram Report

____________________________________________________________________________



Name: PAIGE RODRIGUEZ

MRN: [id number]                Age: 87 yrs

Gender: Male                   : 1929

Patient Status: Inpatient      Patient Location: 4W\S\416\S\A

Account #: [id number]

Study Date: 2017 02:45 PM

____________________________________________________________________________



Height: 67 in        Weight: 188 lb        BSA: 2.0 m2



____________________________________________________________________________

Procedure: A two-dimensional transthoracic echocardiogram with color flow

and Doppler was performed. Study Quality: Fair.

Reason For Study: CHF



History: CHF.

Ordering Physician: ERIKA JULES

Performed By: Marleny Yeager



____________________________________________________________________________



Interpretation Summary

The left ventricle is normal in size.

There is normal left ventricular wall thickness.

LV EF is 60%

Left ventricular systolic function is normal.

Doppler measurements suggest impaired left ventricular relaxation, which is

associated with grade I/IV or mild diastolic dysfunction

The left ventricular wall motion is normal.

There is no thrombus.

There is no ventricular septal defect visualized.

The right atrium is normal.

The left atrium is borderline dilated.

There is no evidence of mitral valve prolapse.

There is no mitral valve stenosis.

There is a trace to mild amount of mitral regurgitation

There is no aortic valvular vegetation.

There is no aortic valve stenosis

There is no LVOT obstruction.

There is aortic sclerosis without stenosis.

There is no tricuspid stenosis.

There is a trace amount of tricuspid regurgitation

Right ventricular systolic pressure is normal.

RVSP is 25 mm of Hg , with RA mean of 5.

There is no pericardial effusion.



____________________________________________________________________________



MMode/2D Measurements \T\ Calculations

RVDd: 2.8 cm  LVIDd: 5.0 cm  FS: 33.4 %             Ao root diam: 2.6 cm

IVSd: 1.0 cm  LVIDs: 3.3 cm  EDV(Teich): 119.0 ml

              LVPWd: 0.97 cm ESV(Teich): 45.4 ml    Ao root area: 5.3 cm2

                             EF(Teich): 61.9 %      LA dimension: 4.1 cm



Doppler Measurements \T\ Calculations

MV E max jignesh:       MV P1/2t max jignesh:    Ao V2 max:       LV V1 max P.3 cm/sec         50.8 cm/sec          95.5 cm/sec      2.2 mmHg

MV A max jignesh:       MV P1/2t: 103.1 msec Ao max PG:       LV V1 max:

113.0 cm/sec                             3.7 mmHg         74.0 cm/sec

MV E/A: 0.45        MVA(P1/2t): 2.1 cm2

                    MV dec slope:

                    144.4 cm/sec2



        _____________________________________________________________

PA V2 max:          TR max jignesh:

74.5 cm/sec         224.9 cm/sec

PA max P.2 mmHg TR max P.2 mmHg

____________________________________________________________________________



Left Ventricle

The left ventricle is normal in size. There is normal left ventricular wall

thickness. LV EF is 60%. Left ventricular systolic function is normal.

Doppler measurements suggest impaired left ventricular relaxation, which is

associated with grade I/IV or mild diastolic dysfunction. The left

ventricular wall motion is normal. There is no thrombus. There is no

ventricular septal defect visualized.



Right Ventricle

The right ventricle is normal in size and function.



Atria

The right atrium is normal. The left atrium is borderline dilated. The

interatrial septum is intact with no evidence for an atrial septal defect.



Mitral Valve

There is no evidence of mitral valve prolapse. There is no vegetation seen

on the mitral valve. There is no mitral valve stenosis. There is a trace to

mild amount of mitral regurgitation.





Aortic Valve

There is no aortic valvular vegetation. There is no aortic valve stenosis.

There is no LVOT obstruction. There is aortic sclerosis without stenosis.

No aortic regurgitation is present.



Tricuspid Valve

There is no tricuspid stenosis. There is a trace amount of tricuspid

regurgitation. Right ventricular systolic pressure is normal. RVSP is 25 mm

of Hg , with RA mean of 5.



Pulmonic Valve

There is no pulmonic valvular stenosis. There is no pulmonic valvular

regurgitation.



Great Vessels

The aortic root is normal size.



Effusions

There is no pericardial effusion.





____________________________________________________________________________

Electronically signed by:      Ayse Her      on 2017 09:06

PM



CC: ERIKA JULES

>

Ayse Her

## 2017-02-20 NOTE — PDOC PROGRESS REPORT
Subjective


Progress Note for:: 02/20/17


Subjective:: 


He is feeling much better today and cough and dyspnea have improved; no longer 

vomiting. He is scheduled for ECHO today. Discussed plan of care with son and 

pt is for possible discharge home tomorrow.





Physical Exam


Vital Signs: 


 











Temp Pulse Resp BP Pulse Ox


 


 97.5 F   70   16   144/60 H  98 


 


 02/20/17 07:57  02/20/17 07:57  02/20/17 07:57  02/20/17 07:57  02/20/17 07:57








 Intake & Output











 02/19/17 02/20/17 02/21/17





 06:59 06:59 06:59


 


Intake Total  690 


 


Balance  690 


 


Weight 85.3 kg  











General appearance: PRESENT: no acute distress, well-developed, well-nourished


Eye exam: PRESENT: EOMI, PERRLA


Mouth exam: PRESENT: moist, neck supple


Respiratory exam: PRESENT: decreased breath sounds


Cardiovascular exam: PRESENT: +S1, +S2


GI/Abdominal exam: PRESENT: normal bowel sounds, soft


Rectal exam: PRESENT: deferred


Neurological exam: PRESENT: alert, oriented to person, oriented to place


Psychiatric exam: PRESENT: normal mood





Results


Laboratory Results: 


 





 02/19/17 08:50 





 02/19/17 08:50 





 





02/18/17 21:40   Sputum   Gram Stain - Final


02/18/17 21:40   Sputum   Sputum Culture - Final


                            NORMAL SUDHAKAR








Impressions: 


 





Lumbar Spine X-Ray  02/16/17 20:21


IMPRESSION:  Limiting osteopenia.  No gross fracture or spinal malalignment.


 








Acute Abdomen Series  02/17/17 02:13


IMPRESSION:  Nonspecific bowel gas pattern.


Mild vascular congestion.  Mild bibasilar atelectasis.  Small left pleural 

effusion.


 








Chest X-Ray  02/18/17 00:00


IMPRESSION:  Cardiac enlargement with mild vascular congestion and interstitial 

edema.  Basilar pneumonia could be present, as above.


 














Assessment & Plan





- Diagnosis


(1) Congestive heart failure (CHF)





Is this a current diagnosis for this admission?: YesPlan: 





Ct  Lasix 20 mg qd IV; ct with KCL 20 mEQ daily po; fluid input/out put chart; 

daily wt; monitor chemistries daily. F/u ECHO report.








(2) Pneumonia


Qualifiers: 


     Pneumonia type: due to unspecified organism     Laterality: left     Lung 

location: lower lobe of lung        Qualified Code(s): J18.1 - Lobar pneumonia, 

unspecified organism  


Is this a current diagnosis for this admission?: YesPlan: 


Ct with Augmentin and Azithromycin; Oxygen by N/C 2 L/min, Mucinex and TYlenol 

650 mg q6h prn.








(3) Vomiting





Is this a current diagnosis for this admission?: YesPlan: 





Pt is no longer vomiting; D/C IVF due to fluid overload. Ct with Zofran 4 mg 

q6h prn IV. Monitor him closely; 4 hourly neuro checks; seizure and fall 

precautions.








(4) Diabetes mellitus type 2 in nonobese


Is this a current diagnosis for this admission?: YesPlan: 


Ct with IV fluids; Ct with  Januvia 100 mg daily po; accucheck qac, qhs ; 

slidding scale with humalog insulin as per [facility] protocol; 1800 caloris ADA diet. 

F/u HBA1C.








(5) Hypertension





Is this a current diagnosis for this admission?: YesPlan: 


Ct with Quinapril 20 mg daily po; Cardizem 120 mg daily po; 2 g sodium diet.








(6) Hyperlipidemia





Is this a current diagnosis for this admission?: YesPlan: 


Ct with Atorvastatin 20 mg qhs po; 200 mg cholesterol diet.








(7) CAD (coronary artery disease) of bypass graft





Is this a current diagnosis for this admission?: YesPlan: 


CT with Aspirin 81 mg daily po; 2 G sodium, low cholesterol diet.








(8) Reflux esophagitis


Is this a current diagnosis for this admission?: YesPlan: 


Ct with Prevacid 30 mg daily po.








(9) Constipation





Is this a current diagnosis for this admission?: YesPlan: 


Ct with Linzess 290 mg daily po; Colace 100 mg daily po.








(10) DVT prophylaxis


Is this a current diagnosis for this admission?: YesPlan: 


Ct with Lovenox 40 mg daily subcut; SCD.











- Time


Time Spent with patient: 35 or more minutes


Medications reviewed and adjusted accordingly: Yes


Anticipated discharge: Home


Within: within 24 hours

## 2017-02-21 VITALS — DIASTOLIC BLOOD PRESSURE: 53 MMHG | SYSTOLIC BLOOD PRESSURE: 131 MMHG

## 2017-02-21 RX ADMIN — AMOXICILLIN AND CLAVULANATE POTASSIUM SCH TAB: 500; 125 TABLET, FILM COATED ORAL at 06:23

## 2017-02-21 RX ADMIN — LANSOPRAZOLE SCH MG: 30 TABLET, ORALLY DISINTEGRATING, DELAYED RELEASE ORAL at 10:04

## 2017-02-21 RX ADMIN — AZITHROMYCIN SCH MG: 250 TABLET, FILM COATED ORAL at 10:02

## 2017-02-21 RX ADMIN — DORZOLAMIDE HYDROCHLORIDE AND TIMOLOL MALEATE SCH DROP: 20; 5 SOLUTION OPHTHALMIC at 11:59

## 2017-02-21 RX ADMIN — GUAIFENESIN SCH MG: 600 TABLET, EXTENDED RELEASE ORAL at 10:03

## 2017-02-21 RX ADMIN — AMOXICILLIN AND CLAVULANATE POTASSIUM SCH TAB: 500; 125 TABLET, FILM COATED ORAL at 14:26

## 2017-02-21 RX ADMIN — SITAGLIPTIN SCH MG: 50 TABLET, FILM COATED ORAL at 10:03

## 2017-02-21 RX ADMIN — POTASSIUM CHLORIDE SCH MEQ: 750 TABLET, FILM COATED, EXTENDED RELEASE ORAL at 10:03

## 2017-02-21 RX ADMIN — DILTIAZEM HYDROCHLORIDE SCH MG: 120 CAPSULE, COATED, EXTENDED RELEASE ORAL at 10:03

## 2017-02-21 RX ADMIN — ENOXAPARIN SODIUM SCH MG: 40 INJECTION SUBCUTANEOUS at 10:01

## 2017-02-21 RX ADMIN — FUROSEMIDE SCH MG: 10 INJECTION, SOLUTION INTRAMUSCULAR; INTRAVENOUS at 10:02

## 2017-02-21 RX ADMIN — ENALAPRIL MALEATE SCH MG: 10 TABLET ORAL at 10:03

## 2017-02-21 NOTE — PDOC DISCHARGE SUMMARY
General





- Admit/Disc Date/PCP


Admission Date/Primary Care Provider: 


  02/18/17 17:51





  ERIKA JULES





Discharge Date: 02/21/17





- Discharge Diagnosis


(1) Congestive heart failure (CHF)


Is this a current diagnosis for this admission?: Yes





(2) Pneumonia


Is this a current diagnosis for this admission?: Yes





(3) Vomiting


Is this a current diagnosis for this admission?: Yes





(4) Diabetes mellitus type 2 in nonobese


Is this a current diagnosis for this admission?: Yes





(5) Hypertension


Is this a current diagnosis for this admission?: Yes





(6) Hyperlipidemia


Is this a current diagnosis for this admission?: Yes





(7) CAD (coronary artery disease) of bypass graft


Is this a current diagnosis for this admission?: Yes





(8) Reflux esophagitis


Is this a current diagnosis for this admission?: Yes





(9) Constipation


Is this a current diagnosis for this admission?: Yes





(10) DVT prophylaxis


Is this a current diagnosis for this admission?: Yes








- Additional Information


Resuscitation Status: Full Code


Discharge Diet: As Tolerated


Discharge Activity: Activity As Tolerated, Balance Activity w/Rest, Weigh Daily


Home Medications: 








Atorvastatin Calcium [Lipitor 20 mg Tablet] 20 g PO DAILY 02/17/17 


Cholecalciferol (Vitamin D3) [Vitamin D3 2000 unit Tablet] 2,000 unit PO DAILY 

02/17/17 


Diltiazem HCl [Diltiazem 24Hr Cd] 120 mg PO DAILY 02/17/17 


Dorzolamide HCl/Timolol Maleat [Dorzolamide-Timolol Eye Drops] 1 drop OP BID 02/ 17/17 


Furosemide [Lasix] 20 mg PO DAILY 02/17/17 


Omeprazole 20 mg PO DAILY 02/17/17 


Potassium Chloride [K-Tab ER] 20 meq PO DAILY 02/17/17 


Quinapril HCl [Accupril 20 mg Tablet] 20 mg PO DAILY 02/17/17 


Sitagliptin Phosphate [Januvia] 100 mg PO DAILY 02/17/17 


Amox Tr/Potassium Clavulanate [Augmentin  "500" Tablet] 1 tab PO Q8 #21 tablet 

02/21/17 


Azithromycin [Zithromax 250 mg Tablet] 500 mg PO DAILY #7 tablet 02/21/17 











History of Present Illness


History of Present Illness: 


PAIGE RODRIGUEZ is a 87 year old male


87 yr old man with hx of DM2/HTN/Hyperlipidemia/CAD s.p CABG/stent/CHF/GERD/

Constipation/Back pain who presented to ER earlier on account of mechanical 

fall and was seen and had xray L/S spine that was normal and was sent home. He 

later started having nausea/vomiting and was brought back; denied  headache, 

seizures, blurring of vision, chest pain, dyspnea, diarrhea, abd. pain, fever.





Hospital Course


Hospital Course: 


87 year old man who was admitted for nausea/vomiting with dehydration and 

pneumonia and was later found out to be mild fluid overloaded and CHF 

decompensation- diastolic dysfunction. Had ECHO that showed EF of 60% and no 

regional wall motion abnormality. He is stable and will be D/C home with 

antibiotics. He was also D/C with home health services with [facility] 

for PT and nurse aide. Pt and his son are in agreement with plan of care.





Physical Exam


Vital Signs: 


 











Temp Pulse Resp BP Pulse Ox


 


 98.2 F   69   15   131/53 H  98 


 


 02/21/17 14:32  02/21/17 14:32  02/21/17 14:32  02/21/17 14:32  02/21/17 14:32








 Intake & Output











 02/20/17 02/21/17 02/22/17





 06:59 06:59 06:59


 


Intake Total 690 320 500


 


Output Total   500


 


Balance 690 320 0


 


Weight  85.3 kg 











General appearance: PRESENT: no acute distress, well-developed, well-nourished


Head exam: PRESENT: atraumatic, normocephalic


Eye exam: PRESENT: EOMI, PERRLA


Mouth exam: PRESENT: moist, neck supple


Respiratory exam: PRESENT: decreased breath sounds


Cardiovascular exam: PRESENT: +S1, +S2


Pulses: PRESENT: +2 pedal pulses bilateral


GI/Abdominal exam: PRESENT: normal bowel sounds, soft


Rectal exam: PRESENT: deferred


Neurological exam: PRESENT: alert, awake, oriented to person, oriented to place

, oriented to time


Psychiatric exam: PRESENT: normal mood





Results


Laboratory Results: 


 





 02/20/17 13:37 





 02/20/17 13:37 








Impressions: 


 





Lumbar Spine X-Ray  02/16/17 20:21


IMPRESSION:  Limiting osteopenia.  No gross fracture or spinal malalignment.


 








Acute Abdomen Series  02/17/17 02:13


IMPRESSION:  Nonspecific bowel gas pattern.


Mild vascular congestion.  Mild bibasilar atelectasis.  Small left pleural 

effusion.


 








Chest X-Ray  02/18/17 00:00


IMPRESSION:  Cardiac enlargement with mild vascular congestion and interstitial 

edema.  Basilar pneumonia could be present, as above.


 














Plan


Time Spent: Greater than 30 Minutes

## 2017-07-22 ENCOUNTER — HOSPITAL ENCOUNTER (EMERGENCY)
Dept: HOSPITAL 62 - ER | Age: 82
Discharge: HOME | End: 2017-07-22
Payer: MEDICARE

## 2017-07-22 VITALS — DIASTOLIC BLOOD PRESSURE: 87 MMHG | SYSTOLIC BLOOD PRESSURE: 179 MMHG

## 2017-07-22 DIAGNOSIS — Z95.1: ICD-10-CM

## 2017-07-22 DIAGNOSIS — Z87.891: ICD-10-CM

## 2017-07-22 DIAGNOSIS — I10: ICD-10-CM
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
## 2017-07-22 DIAGNOSIS — S52.232A: Primary | ICD-10-CM

## 2017-07-22 DIAGNOSIS — E11.9: ICD-10-CM

## 2017-07-22 DIAGNOSIS — I25.2: ICD-10-CM
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
## 2017-07-22 DIAGNOSIS — I25.10: ICD-10-CM

## 2017-07-22 DIAGNOSIS — R60.0: ICD-10-CM

## 2017-07-22 DIAGNOSIS — Z98.61: ICD-10-CM

## 2017-07-22 PROCEDURE — 29125 APPL SHORT ARM SPLINT STATIC: CPT

## 2017-07-22 PROCEDURE — 2W3DX1Z IMMOBILIZATION OF LEFT LOWER ARM USING SPLINT: ICD-10-PCS | Performed by: PHYSICIAN ASSISTANT

## 2017-07-22 PROCEDURE — 99283 EMERGENCY DEPT VISIT LOW MDM: CPT

## 2017-07-22 PROCEDURE — 73090 X-RAY EXAM OF FOREARM: CPT

## 2017-07-22 NOTE — RADIOLOGY REPORT (SQ)
EXAM DESCRIPTION:  FOREARM LEFT



COMPLETED DATE/TIME:  7/22/2017 2:23 pm



REASON FOR STUDY:  left wrist/forearm pain s/p fall



COMPARISON:  None.



NUMBER OF VIEWS:  Two views.



TECHNIQUE:  Two radiographic images acquired of the left forearm, including elbow and wrist in at anita
st one projection.



LIMITATIONS:  Patient positioning.



FINDINGS:  MINERALIZATION: Osteopenia.

BONES: Mildly displaced oblique fracture through the distal ulnar diaphysis.  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
lnar styloid fracture and old ununited triquetrum fracture.

SOFT TISSUES: Associated soft tissue swelling.

OTHER: No other significant finding.



IMPRESSION:  DISTAL ULNAR FRACTURE AS ABOVE.



TECHNICAL DOCUMENTATION:  JOB ID:  [id number]

 2011 [employer]- All Rights Reserved

## 2017-09-28 ENCOUNTER — HOSPITAL ENCOUNTER (OUTPATIENT)
Dept: HOSPITAL 62 - ER | Age: 82
Setting detail: OBSERVATION
LOS: 1 days | Discharge: HOME | End: 2017-09-29
Attending: INTERNAL MEDICINE | Admitting: INTERNAL MEDICINE
Payer: MEDICARE

## 2017-09-28 DIAGNOSIS — K21.0: ICD-10-CM

## 2017-09-28 DIAGNOSIS — Z79.82: ICD-10-CM

## 2017-09-28 DIAGNOSIS — I50.32: ICD-10-CM

## 2017-09-28 DIAGNOSIS — I11.0: ICD-10-CM

## 2017-09-28 DIAGNOSIS — Z79.899: ICD-10-CM

## 2017-09-28 DIAGNOSIS — I25.2: ICD-10-CM

## 2017-09-28 DIAGNOSIS — I63.9: Primary | ICD-10-CM

## 2017-09-28 DIAGNOSIS — Z95.1: ICD-10-CM

## 2017-09-28 DIAGNOSIS — I69.392: ICD-10-CM

## 2017-09-28 DIAGNOSIS — E11.9: ICD-10-CM

## 2017-09-28 DIAGNOSIS — Z95.5: ICD-10-CM

## 2017-09-28 DIAGNOSIS — G81.94: ICD-10-CM

## 2017-09-28 DIAGNOSIS — R50.81: ICD-10-CM

## 2017-09-28 DIAGNOSIS — I25.810: ICD-10-CM

## 2017-09-28 DIAGNOSIS — R47.81: ICD-10-CM

## 2017-09-28 DIAGNOSIS — K59.00: ICD-10-CM

## 2017-09-28 DIAGNOSIS — E78.2: ICD-10-CM

## 2017-09-28 DIAGNOSIS — M54.5: ICD-10-CM

## 2017-09-28 DIAGNOSIS — Z87.891: ICD-10-CM

## 2017-09-28 LAB
ALBUMIN SERPL-MCNC: 4.1 G/DL (ref 3.5–5)
ALP SERPL-CCNC: 101 U/L (ref 38–126)
ALT SERPL-CCNC: 20 U/L (ref 21–72)
ANION GAP SERPL CALC-SCNC: 15 MMOL/L (ref 5–19)
APPEARANCE UR: (no result)
APTT BLD: 38.5 SEC (ref 23.5–35.8)
AST SERPL-CCNC: 22 U/L (ref 17–59)
BASE EXCESS BLDV CALC-SCNC: -1.3 MMOL/L
BASOPHILS # BLD AUTO: 0 10^3/UL (ref 0–0.2)
BASOPHILS NFR BLD AUTO: 0.2 % (ref 0–2)
BILIRUB DIRECT SERPL-MCNC: 0.4 MG/DL (ref 0–0.4)
BILIRUB SERPL-MCNC: 0.9 MG/DL (ref 0.2–1.3)
BILIRUB UR QL STRIP: NEGATIVE
BUN SERPL-MCNC: 21 MG/DL (ref 7–20)
CALCIUM: 9.7 MG/DL (ref 8.4–10.2)
CHLORIDE SERPL-SCNC: 103 MMOL/L (ref 98–107)
CK MB SERPL-MCNC: 0.37 NG/ML (ref ?–4.55)
CK SERPL-CCNC: 117 U/L (ref 55–170)
CO2 SERPL-SCNC: 22 MMOL/L (ref 22–30)
CREAT SERPL-MCNC: 1.03 MG/DL (ref 0.52–1.25)
EOSINOPHIL # BLD AUTO: 0 10^3/UL (ref 0–0.6)
EOSINOPHIL NFR BLD AUTO: 0.1 % (ref 0–6)
ERYTHROCYTE [DISTWIDTH] IN BLOOD BY AUTOMATED COUNT: 16.4 % (ref 11.5–14)
GLUCOSE SERPL-MCNC: 176 MG/DL (ref 75–110)
GLUCOSE UR STRIP-MCNC: NEGATIVE MG/DL
HCO3 BLDV-SCNC: 23.2 MMOL/L (ref 20–32)
HCT VFR BLD CALC: 33.5 % (ref 37.9–51)
HGB BLD-MCNC: 11.5 G/DL (ref 13.5–17)
HGB HCT DIFFERENCE: 1
KETONES UR STRIP-MCNC: (no result) MG/DL
LYMPHOCYTES # BLD AUTO: 0.6 10^3/UL (ref 0.5–4.7)
LYMPHOCYTES NFR BLD AUTO: 6.8 % (ref 13–45)
MCH RBC QN AUTO: 27.5 PG (ref 27–33.4)
MCHC RBC AUTO-ENTMCNC: 34.4 G/DL (ref 32–36)
MCV RBC AUTO: 80 FL (ref 80–97)
MONOCYTES # BLD AUTO: 1.2 10^3/UL (ref 0.1–1.4)
MONOCYTES NFR BLD AUTO: 12.7 % (ref 3–13)
NEUTROPHILS # BLD AUTO: 7.5 10^3/UL (ref 1.7–8.2)
NEUTS SEG NFR BLD AUTO: 80.2 % (ref 42–78)
NITRITE UR QL STRIP: NEGATIVE
PCO2 BLDV: 37.9 MMHG (ref 35–63)
PH BLDV: 7.4 [PH] (ref 7.3–7.42)
PH UR STRIP: 5 [PH] (ref 5–9)
POTASSIUM SERPL-SCNC: 4.2 MMOL/L (ref 3.6–5)
PROT SERPL-MCNC: 8 G/DL (ref 6.3–8.2)
PROT UR STRIP-MCNC: 30 MG/DL
PROTHROMBIN TIME: 14.4 SEC (ref 11.4–15.4)
RBC # BLD AUTO: 4.19 10^6/UL (ref 4.35–5.55)
SODIUM SERPL-SCNC: 139.7 MMOL/L (ref 137–145)
SP GR UR STRIP: 1.01
TROPONIN I SERPL-MCNC: 0.01 NG/ML
UROBILINOGEN UR-MCNC: NEGATIVE MG/DL (ref ?–2)
WBC # BLD AUTO: 9.3 10^3/UL (ref 4–10.5)

## 2017-09-28 PROCEDURE — 93306 TTE W/DOPPLER COMPLETE: CPT

## 2017-09-28 PROCEDURE — 81001 URINALYSIS AUTO W/SCOPE: CPT

## 2017-09-28 PROCEDURE — 70553 MRI BRAIN STEM W/O & W/DYE: CPT

## 2017-09-28 PROCEDURE — G0378 HOSPITAL OBSERVATION PER HR: HCPCS

## 2017-09-28 PROCEDURE — 36415 COLL VENOUS BLD VENIPUNCTURE: CPT

## 2017-09-28 PROCEDURE — 82962 GLUCOSE BLOOD TEST: CPT

## 2017-09-28 PROCEDURE — 97110 THERAPEUTIC EXERCISES: CPT

## 2017-09-28 PROCEDURE — 93010 ELECTROCARDIOGRAM REPORT: CPT

## 2017-09-28 PROCEDURE — 97167 OT EVAL HIGH COMPLEX 60 MIN: CPT

## 2017-09-28 PROCEDURE — 93880 EXTRACRANIAL BILAT STUDY: CPT

## 2017-09-28 PROCEDURE — 87040 BLOOD CULTURE FOR BACTERIA: CPT

## 2017-09-28 PROCEDURE — 80061 LIPID PANEL: CPT

## 2017-09-28 PROCEDURE — 71010: CPT

## 2017-09-28 PROCEDURE — 80053 COMPREHEN METABOLIC PANEL: CPT

## 2017-09-28 PROCEDURE — 92610 EVALUATE SWALLOWING FUNCTION: CPT

## 2017-09-28 PROCEDURE — A9577 INJ MULTIHANCE: HCPCS

## 2017-09-28 PROCEDURE — 82550 ASSAY OF CK (CPK): CPT

## 2017-09-28 PROCEDURE — 85730 THROMBOPLASTIN TIME PARTIAL: CPT

## 2017-09-28 PROCEDURE — 83605 ASSAY OF LACTIC ACID: CPT

## 2017-09-28 PROCEDURE — 93005 ELECTROCARDIOGRAM TRACING: CPT

## 2017-09-28 PROCEDURE — 97163 PT EVAL HIGH COMPLEX 45 MIN: CPT

## 2017-09-28 PROCEDURE — 99291 CRITICAL CARE FIRST HOUR: CPT

## 2017-09-28 PROCEDURE — 85025 COMPLETE CBC W/AUTO DIFF WBC: CPT

## 2017-09-28 PROCEDURE — 82553 CREATINE MB FRACTION: CPT

## 2017-09-28 PROCEDURE — 85610 PROTHROMBIN TIME: CPT

## 2017-09-28 PROCEDURE — 87086 URINE CULTURE/COLONY COUNT: CPT

## 2017-09-28 PROCEDURE — 83036 HEMOGLOBIN GLYCOSYLATED A1C: CPT

## 2017-09-28 PROCEDURE — 51701 INSERT BLADDER CATHETER: CPT

## 2017-09-28 PROCEDURE — 84443 ASSAY THYROID STIM HORMONE: CPT

## 2017-09-28 PROCEDURE — 84484 ASSAY OF TROPONIN QUANT: CPT

## 2017-09-28 PROCEDURE — 70450 CT HEAD/BRAIN W/O DYE: CPT

## 2017-09-28 PROCEDURE — 82803 BLOOD GASES ANY COMBINATION: CPT

## 2017-09-28 PROCEDURE — 96374 THER/PROPH/DIAG INJ IV PUSH: CPT

## 2017-09-28 NOTE — RADIOLOGY REPORT (SQ)
EXAM DESCRIPTION:  CHEST SINGLE VIEW



COMPLETED DATE/TIME:  9/28/2017 7:50 pm



REASON FOR STUDY:  left-side weakness



COMPARISON:  February 2017



EXAM PARAMETERS:  NUMBER OF VIEWS: One view.

TECHNIQUE: Single frontal radiographic view of the chest acquired.

RADIATION DOSE: NA

LIMITATIONS: Patient has made a shallow inspiration



FINDINGS:  LUNGS AND PLEURA: No opacities, masses or pneumothorax. No pleural effusion.

MEDIASTINUM AND HILAR STRUCTURES: No masses.  Contour normal.

HEART AND VASCULAR STRUCTURES: Cardiac silhouette remains enlarged.

BONES: No acute findings.

HARDWARE: Patient is status post median sternotomy.

OTHER: No other significant finding.



IMPRESSION:  No significant interval change.  No acute changes.  Other findings as noted above



TECHNICAL DOCUMENTATION:  JOB ID:  [id number]

## 2017-09-28 NOTE — ER DOCUMENT REPORT
ED Neuro Symptoms/Deficit





- General


Chief Complaint: S/S of Possible Stroke


Stated Complaint: SLURRED SPEECH


Time Seen by Provider: 09/28/17 19:40


Notes: 


The patient is an 88-year-old male, past medical history hypertension, prior 

CVA with residual left facial droop, diabetes, presents with slurred speech and 

left-sided arm and leg weakness that started at 1400 today.  According to his 

son at bedside, patient has been leaning towards the left side since 1400 and 

has never had this before.  His speech is improving since arriving to the ER.  

Patient recently had a urologic procedure on his prostate performed 5 days ago 

and he has a fever on arrival to the emergency room.  He denies headache, head 

injury, nausea, vomiting, blurry vision, numbness, tingling, chest pain, 

shortness of breath, abdominal pain, nausea, vomiting, rash, flank pain or 

urinary symptoms.


TRAVEL OUTSIDE OF THE [place] IN LAST 30 DAYS: No





- Related Data


Allergies/Adverse Reactions: 


 





No Known Allergies Allergy (Verified 09/28/17 19:14)


 











Past Medical History





- General


Information source: Patient, Relative





- Social History


Smoking Status: Unknown if Ever Smoked


Family History: Reviewed & Not Pertinent





- Past Medical History


Cardiac Medical History: Reports: Hx Coronary Artery Disease, Hx Heart Attack, 

Hx Hypercholesterolemia, Hx Hypertension


Endocrine Medical History: Reports: Hx Diabetes Mellitus Type 2


Renal/ Medical History: Reports: Hx Benign Prostatic Hyperplasia.  Denies: Hx 

Peritoneal Dialysis


Psychiatric Medical History: 


   Denies: Hx Depression


Past Surgical History: Reports: Hx Cardiac Catheterization, Hx Cardiac Surgery, 

Hx Coronary Artery Bypass Graft - 5 vessel CABG in 1983, Hx Coronary Stent - 4 

stents in 2005





Review of Systems





- Review of Systems


Notes: 


REVIEW OF SYSTEMS:


CONSTITUTIONAL: +fevers, -chills


EENT: -eye pain, -difficulty swallowing, -nasal congestion


CARDIOVASCULAR:-chest pain, -syncope.


RESPIRATORY: -cough, -SOB


GASTROINTESTINAL:  -abdominal pain, - nausea, -vomiting, -diarrhea


GENITOURINARY: -dysuria, -hematuria


MUSCULOSKELETAL: -back pain, -neck pain


SKIN: -rash or skin lesions.


HEMATOLOGIC: -easy bruising or bleeding.


LYMPHATIC: -swollen, enlarged glands.


NEUROLOGICAL: -altered mental status or loss of consciousness, -headache, +

slurred speech, +left-sided arm and leg weakness


PSYCHIATRIC: -anxiety, -depression.


ALL OTHER SYSTEMS REVIEWED AND NEGATIVE.





Physical Exam





- Vital signs


Vitals: 


 











Temp Pulse Resp BP Pulse Ox


 


 100.9 F H  111 H  18   146/65 H  94 


 


 09/28/17 19:14  09/28/17 19:14  09/28/17 19:14  09/28/17 19:14  09/28/17 19:14














- Notes


Notes: 


PHYSICAL EXAMINATION:





GENERAL: No acute distress.





HEAD: Atraumatic, normocephalic.





EYES: Pupils equal round and reactive to light, extraocular movements intact, 

sclera anicteric, conjunctiva are normal.





ENT: nares patent, oropharynx clear without exudates.  Moist mucous membranes.





NECK: Normal range of motion, supple without lymphadenopathy





LUNGS: Breath sounds clear to auscultation bilaterally and equal.  No wheezes 

rales or rhonchi.





HEART: Tachycardia, regular rhythm





ABDOMEN: Soft, nontender, normoactive bowel sounds.  No guarding, no rebound.  

No masses appreciated.





EXTREMITIES: Normal range of motion, no pitting or edema.  No cyanosis.





NEUROLOGICAL: Left-sided facial droop (chronic), 4/5 strength in LUE and LLE, 5/

5 strength in RUE and RLE, sensation intact





PSYCH: Normal mood, normal affect.





SKIN: Warm, Dry, normal turgor, no rashes or lesions noted.





Course





- Re-evaluation


Re-evalutation: 


Patient presented to the ER 5 hours after onset of symptoms of left upper and 

lower extremity weakness and slurred speech.  His slurred speech is improving. 

Pt is not a TPA candidate due to 5 hours of symptoms and resolution of some of 

his strokelike symptoms. NIHSS 6 and his ABCD2 score is 6. He is noted to be 

febrile and tachycardic, which resolved after IVF.  Chest x-ray and urine do 

not show any signs of infection and no other clear source of infection.  Blood 

cultures sent. Pt's initial BP was 201/96, which improved to 156/92 after 

Labetalol.





09/28/17 22:22 Spoke to Dr. Biswas (his PMD) and will admit patient to [facility] as 

Inpatient for further evaluation and treatment of his stroke-like symptoms and 

fever.





- Vital Signs


Vital signs: 


 











Temp Pulse Resp BP Pulse Ox


 


 100.9 F H  107 H  23 H  201/102 H  96 


 


 09/28/17 19:14  09/28/17 19:47  09/28/17 20:51  09/28/17 20:51  09/28/17 20:51














- Laboratory


Result Diagrams: 


 09/28/17 20:03





 09/28/17 20:03


Laboratory results interpreted by me: 


 











  09/28/17 09/28/17 09/28/17





  20:03 20:03 20:03


 


RBC  4.19 L  


 


Hgb  11.5 L  


 


Hct  33.5 L  


 


RDW  16.4 H  


 


Seg Neutrophils %  80.2 H  


 


Lymphocytes %  6.8 L  


 


APTT   38.5 H 


 


BUN    21 H


 


Glucose    176 H


 


ALT    20 L














- Diagnostic Test


Radiology reviewed: Image reviewed, Reports reviewed


Radiology results interpreted by me: 


CT Head: microvascular changes, NAD


CXR: NAD





- EKG Interpretation by Me


EKG shows normal: Sinus rhythm, Axis, QRS Complexes, ST-T Waves


Rate: Tachycardia


Axis/QRS: RBBB


When compared to previous EKG there are: No significant change





Critical Care Note





- Critical Care Note


Total time excluding time spent on procedures (mins): 35





ED Alteplase Inc/Exc Criteria





- Date/Time patient last known well:


Date/Time: 9/28/2017 14:00





- Date/Time patient arrived in ED:


_: 9/28/2017 19:00





- Inclusion Criteria:


1: Patient presented to ED within 3 hours of acute ischemic stroke symptom onset

?


-: No


2: Did baseline CT exclude intracranial hemorrhage and/or other risk factors?


-: Yes


3: Is the age of the patient 18 years of age or greater?


-: No


*****: If any of the above questions are answered "NO" then stop, patient is 

not a candidate for Alteplase,


*****: If all of the above questions are answered "YES" then continue with 

Exclusion Criteria.





- Exclusion Criteria:


1: Is there evidence of intracranial hemorrhage on baseline CT?


2: Is there suspicion of subarachnoid hemorrhage (even if CT negative)?


3: Is there a history of serious head trauma, recent previous stroke or MI 

within 3 months?


4: Does the patient have a clinical presentation consistent with MI or post-MI 

pericarditis?


5: Is there history of intracranial hemorrhage?


6: On repeated measurement is Systolic BP greater than 185mmHg or Diastolic BP 

greater that 110 mmHg and is aggressive treatment needed to reduce blood 

pressure to these limits (e.g. constant infusion of an anti-hypertensive)?


7: Did the patient awake with stroke symptoms?


8: Has the patient had a lumbar puncture or an arterial puncture at a non-

compressile site within 7 days?


9: With in the last 14 days did the patient have surgery or major trauma?


10: Is the patient pregnant or postpartum less than 2 weeks?


11: Was there any active bleeding or acute trauma?


12: Does the patient have intracranial neoplasm, arteriovenous malformation or 

aneurysm?


13: Does the patient have abnormal glucose (less than 50 or greater than 400mg/

dl)?  Record glucose in Comment.


14: Patient has rapidly improving symptoms at the time Alteplase is to be 

Administered.


15: Does the patient have any risks for bleeding, including but not limited to:


a.: Current use of Coumadin with PT greater than 15 seconds or INR greater than 

1.7.


b.: Current use of Pradaxa (Dabigatran).


c.: Heparin administereed within the past 48 hours and PTT elevated.


d.: Platelet count less than 100,000/mm.


e.: Major surgery or serious trauma within 14 days.


f.: Gastrointestinal or gynecological urinary bleeding within 14 days.


g.: Myocardial Infarction (MI) within 3 months.


*****: If the answer to any of the above questions is "YES" then stop, the 

patient is not a candidate for Alteplase.


*****: If the answer to all of the above questions is "NO" then the patient may 

be eligible for the Administration of Alteplase.


*****: If the patient is noted to have seizure activity at onset of Stroke 

symptoms; Consult Neurologist for further evaluation.





- The patient is:


-: Included and is eligible to receive Alteplase.  *Initiate bed placement at 

higher level of care*


--: No


Reviewd risks & benefits of thrombolytic therapy: I have reviewed the risks and 

benefits of thrombolytic therapy with the patient and/or his/her family.


Yes


-: Excluded and not eligible to receive Alteplase for the above exclusions.


--: Yes - 5 hours of symptoms, resolving symptoms


-: Excluded and not eligible to receive Alteplase for other reasons (specify in 

comments):





- Diagnosis of TIA:


-: Patient presented with transient symptoms that are now resolved and no other 

neurologic findings are currently present. List symptoms in comments.


-: Yes - slurred speech


-: Patient is NOT a candidate for tPA.


-: Yes


-:  ____(put name in comment)______ has been consulted for admission and 

continued evaluation of risk factor assessment.





ED NIH Stroke Scale





- NIH Stroke Scale


When completed:: Before Alteplase


*: 1. NIH scale should be completed with appropriate accompanying assessment 

tools.


*: 2. The NIH should reflect what the patient is capable of doing and should 

not be coached by the clinician.


1a. Level of Consciousness: 0=Alert;keenly responsive


-: 1=Drowsy


-: 2=Obtunded


-: 3=Coma/unresponsive or reflex to noxious stimuli.


1a. Responses: 0


1b. Orientation Questions: a. What month is it?


-: b. How old are you?


-: 0=Answers both questions correctly.


-: 1=Answers one question correctly or patient is intubated or has orotracheal 

trauma.


-: 2=Answers neither question correctly.


1b. Responses: 0


1c. Response to commands: a. Open and close eyes?


-: b.  and release hand?


-: Credit is given despite weakness. Demonstration of task is permitted. 

Substitute command if hands cannot be used.


-: 0=Performs both tasks correctly


-: 1=Performs one task correctly


-: 2=Performs neither task correctly


1c. Responses: 0


2. Gaze: Establish eye contact and instruct patient to "Follow my finger"


-: 0=Normal


-: 1=Partial gaze palsy. Gaze is abnormal in one or both eyes, but where forced 

deviation or total gaze paresis is not present.


-: 2=Forced deviation or total gaze paresis.


2. Responses: 0


3. Visual Fields: Sees fingers in all four quadrants.


-: 0=No visual loss.


-: 1=Partial hemianopsia.


-: 2=Complete hemianopsia.


-: 3=Bilateral hemianopsia (including Cortical blindness)


3. Responses: 0


4. Facial Movement: Instruct patient to:


-: a. Show me your teeth


-: b. Raise your eyebrows


-: c. Close your eyes


-: d. Smile


-: 0=Normal symmetrical movement


-: 1=Minor paralysis (flattened nasolabial fold, asymmetry on smiling).


-: 2=Partial paralysis (total or near total paralysis of lower face).


-: 3=Complete paralysis of upper and lower face


4. Responses: 1


5. Motor functions (left arm): Alternate sides and extend each arm with palms 

down (90 degrees if sitting or 45 degrees for supine).


-: 0=No drift;limb holds for full 10 seconds.


-: 1=Drift; limb holds but drifts down before full 10 seconds, but does not hit 

bed.


-: 2=Some effort against gravity; limb cannot get to or maintain position.


-: 3=No effort against gravity; limb falls.


-: 4=No movement.


-: UN=Amputation, joint fusion, explain in comments.


5. Responses (left arm): 1 - problem


5. Motor Functions (right arm): Alternate sides and extend each arm with palms 

down (90 degrees if sitting or 45 degrees for supine).


-: 0=No drift;limb holds for full 10 seconds.


-: 1=Drift; limb holds but drifts down before full 10 seconds, but does not hit 

bed.


-: 2=Some effort against gravity; limb cannot get to or maintain position.


-: 3=No effort against gravity; limb falls.


-: 4=No movement.


-: UN=Amputation, joint fusion, explain in comments.


5. Responses (right arm): 0


6. Motor Functions (left leg): With patient lying supine, alternate sides and 

extend each leg (30 degrees always while supine).


-: 0=No drift, leg holds position for full 5 seconds


-: 1=Drift; leg falls before full 5 seconds but does not hit bed.


-: 2=Some effort against gravity, leg falls to bed but some effort against 

gravity.


-: 3=No effort against gravity, leg falls to bed immediately.


-: 4=No movement.


-: UN=Amputation, joint fusion; explain in comments.


6. Responses (left leg): 2


6. Motor Functions (right leg): With patient lying supine, alternate sides and 

extend each leg (30 degrees always while supine).


-: 0=No drift, leg holds position for full 5 seconds


-: 1=Drift; leg falls before full 5 seconds but does not hit bed.


-: 2=Some effort against gravity, leg falls to bed but some effort against 

gravity.


-: 3=No effort against gravity, leg falls to bed immediately.


-: 4=No movement.


-: UN=Amputation, joint fusion; explain in comments.


6. Responses (right leg): 1


7. Limb Ataxia: With eyes open instruct patient to:


-: a. "Touch your finger to your nose".


-: b. "Touch your heel to your shin"


-: 0=Absent


-: 1=Present in one limb.


-: 2=Present in two limbs.


-: UN=Amputation or joint fusion; explain in comments.


7. Responses: 1


7. If ataxia present choose as appropriate: Left arm


8. Sensory: Test sensation using pinprick or noxious stimuli.  Test as many 

body parts as possible.


-: 0=Normal;no sensory loss


-: 1=Mile to moderate sensory loss (patient feels pin prick but is less sharp 

on affected side).


-: 2=Severe or total sensory loss.


8. Responses: 0


9. Best Language: Instruct patient to:


-: a. "Describe what you see in this picture."


-: b. "Name the items in this picture."


-: c. "Read these sentences."


-: 0=No aphasia, normal


-: 1=Mild to moderate aphasia.


-: 2=Severe aphasia


-: 3=Mute, global aphasia, no usable speech or auditory comprehension.


9. Responses: 0


10. Articulation, Dysarthia: Instruct patient to:


-: "Read these words" or "Repeat these words"


-: 0=Normal


-: 1=Mild to moderate; patient may slur some words but can be understood 

without difficulty.


-: 2=Severe; patients speech so slurred as to be unintelligible in the absence 

of dysphasia.


-: UN=Intubated or other physical barrier, explain in comments.


10. Responses: 0


11. Extinction or inattention: 0=No abnormality


-: 1= Visual, tactile, auditory, spatial, or personal inattention or extinction 

to bilateral simulation in one or the sensory modalities.


-: 2=Profound martha-inattention or martha-inattention to more than one modality; 

does not recognize own hand.


11. Responses: 0


Total Score: 6





Discharge





- Discharge


Clinical Impression: 


 Stroke-like symptoms





Fever


Qualifiers:


 Fever type: due to other condition Qualified Code(s): R50.81 - Fever 

presenting with conditions classified elsewhere





Condition: Stable


Disposition: ADMITTED AS INPATIENT


Admitting Provider: [facility] [place]


Unit Admitted: [facility]

## 2017-09-29 VITALS — SYSTOLIC BLOOD PRESSURE: 160 MMHG | DIASTOLIC BLOOD PRESSURE: 60 MMHG

## 2017-09-29 LAB
ALBUMIN SERPL-MCNC: 3.3 G/DL (ref 3.5–5)
ALP SERPL-CCNC: 74 U/L (ref 38–126)
ALT SERPL-CCNC: 19 U/L (ref 21–72)
ANION GAP SERPL CALC-SCNC: 9 MMOL/L (ref 5–19)
AST SERPL-CCNC: 26 U/L (ref 17–59)
BASOPHILS # BLD AUTO: 0 10^3/UL (ref 0–0.2)
BASOPHILS NFR BLD AUTO: 0.3 % (ref 0–2)
BILIRUB DIRECT SERPL-MCNC: 0.3 MG/DL (ref 0–0.4)
BILIRUB SERPL-MCNC: 0.7 MG/DL (ref 0.2–1.3)
BUN SERPL-MCNC: 16 MG/DL (ref 7–20)
CALCIUM: 8.9 MG/DL (ref 8.4–10.2)
CHLORIDE SERPL-SCNC: 107 MMOL/L (ref 98–107)
CHOLEST SERPL-MCNC: 164.52 MG/DL (ref 0–200)
CK MB SERPL-MCNC: 0.82 NG/ML (ref ?–4.55)
CK MB SERPL-MCNC: 0.98 NG/ML (ref ?–4.55)
CK SERPL-CCNC: 479 U/L (ref 55–170)
CO2 SERPL-SCNC: 22 MMOL/L (ref 22–30)
CREAT SERPL-MCNC: 0.82 MG/DL (ref 0.52–1.25)
DIRECT HDL: 60 MG/DL (ref 40–?)
EOSINOPHIL # BLD AUTO: 0 10^3/UL (ref 0–0.6)
EOSINOPHIL NFR BLD AUTO: 0.1 % (ref 0–6)
ERYTHROCYTE [DISTWIDTH] IN BLOOD BY AUTOMATED COUNT: 16.1 % (ref 11.5–14)
GLUCOSE SERPL-MCNC: 152 MG/DL (ref 75–110)
HCT VFR BLD CALC: 32.5 % (ref 37.9–51)
HGB BLD-MCNC: 11.5 G/DL (ref 13.5–17)
HGB HCT DIFFERENCE: 2
LDLC SERPL DIRECT ASSAY-MCNC: 74 MG/DL (ref ?–100)
LYMPHOCYTES # BLD AUTO: 0.6 10^3/UL (ref 0.5–4.7)
LYMPHOCYTES NFR BLD AUTO: 8.9 % (ref 13–45)
MCH RBC QN AUTO: 27.7 PG (ref 27–33.4)
MCHC RBC AUTO-ENTMCNC: 35.3 G/DL (ref 32–36)
MCV RBC AUTO: 78 FL (ref 80–97)
MONOCYTES # BLD AUTO: 1 10^3/UL (ref 0.1–1.4)
MONOCYTES NFR BLD AUTO: 14.4 % (ref 3–13)
NEUTROPHILS # BLD AUTO: 5.5 10^3/UL (ref 1.7–8.2)
NEUTS SEG NFR BLD AUTO: 76.3 % (ref 42–78)
POTASSIUM SERPL-SCNC: 3.9 MMOL/L (ref 3.6–5)
PROT SERPL-MCNC: 6.9 G/DL (ref 6.3–8.2)
RBC # BLD AUTO: 4.14 10^6/UL (ref 4.35–5.55)
SODIUM SERPL-SCNC: 137.8 MMOL/L (ref 137–145)
TRIGL SERPL-MCNC: 69 MG/DL (ref ?–150)
TROPONIN I SERPL-MCNC: 0.02 NG/ML
TROPONIN I SERPL-MCNC: 0.03 NG/ML
VLDLC SERPL CALC-MCNC: 14 MG/DL (ref 10–31)
WBC # BLD AUTO: 7.2 10^3/UL (ref 4–10.5)

## 2017-09-29 NOTE — PDOC H&P
History of Present Illness


Admission Date/PCP: 


  09/29/17 00:05





  ERIKA JORGE A





Patient complains of: Left sided weakness with left facial dropp and slurring 

of speech for about 5 hours prior to arrival to ER.


History of Present Illness: 


PAIGE RODRIGUEZ is a 88 year old malewith hx of DM/HTN/Hyperlipidemia/CAD s.p  

stent/CABG/Chronic CHF- diastolic dysfunction/Vitamin D def/Backpain/

Constipation who was D/C from [facility] on 02/21/2017 for pneumonia and CHF 

decompensation.He had urologic procedure about 5 days ago by Dr Villagomez for 

elevated PSA. He started having left sided weakness with left facial drop and 

slurring of speech about 5 hours prior to arrival to ER and was  brought to ER 

by EMS.His BP was elevated at ED and had IV antihypertensives at ED.








Past Medical History


Cardiac Medical History: Reports: Coronary Artery Disease, Myocardial Infarction

, Hyperlipidema, Hypertension


Endocrine Medical History: Reports: Diabetes Mellitus Type 2


Psychiatric Medical History: 


   Denies: Depression





Past Surgical History


Past Surgical History: Reports: Cardiac Catheterization, Coronary Artery Bypass 

Graft - 5 vessel CABG in 1983, Coronary Stent - 4 stents in 2005





Social History


Smoking Status: Former Smoker


Frequency of Alcohol Use: None


Hx Recreational Drug Use: No


Drugs: None


Hx Prescription Drug Abuse: No





- Advance Directive


Resuscitation Status: Full Code





Family History


Family History: Reviewed & Not Pertinent


Parental Family History Reviewed: Yes


Children Family History Reviewed: Yes


Sibling(s) Family History Reviewed.: Yes





Medication/Allergy


Home Medications: 








Atorvastatin Calcium [Lipitor 20 mg Tablet] 20 mg PO DAILY 02/17/17 


Cholecalciferol (Vitamin D3) [Vitamin D3 2000 unit Tablet] 2,000 unit PO DAILY 

02/17/17 


Diltiazem HCl [Diltiazem 24Hr Cd] 120 mg PO DAILY 02/17/17 


Dorzolamide HCl/Timolol Maleat [Dorzolamide-Timolol Eye Drops] 1 drop OP BID 02/ 17/17 


Furosemide [Lasix] 20 mg PO DAILY 02/17/17 


Omeprazole 20 mg PO DAILY 02/17/17 


Potassium Chloride [K-Tab ER] 20 meq PO DAILY 02/17/17 


Quinapril HCl [Accupril 20 mg Tablet] 20 mg PO DAILY 02/17/17 


Sitagliptin Phosphate [Januvia] 100 mg PO DAILY 02/17/17 








Allergies/Adverse Reactions: 


 





No Known Allergies Allergy (Verified 09/28/17 19:14)


 











Review of Systems


All systems: as per PMH


Constitutional: PRESENT: as per HPI, fever(s)


Ears: PRESENT: as per HPI, hearing changes


Nose, Mouth, and Throat: PRESENT: as per HPI


Cardiovascular: PRESENT: as per HPI


Respiratory: PRESENT: as per HPI


Gastrointestinal: PRESENT: as per HPI


Genitourinary: PRESENT: as per HPI


Musculoskeletal: PRESENT: as per HPI


Integumentary: PRESENT: as per HPI


Neurological: PRESENT: abnormal speech, focal weakness


Psychiatric: PRESENT: as per HPI


Endocrine: PRESENT: as per HPI


Hematologic/Lymphatic: PRESENT: as per HPI


Allergic/Immunologic: PRESENT: as per HPI





Physical Exam


Vital Signs: 


 











Temp Pulse Resp BP Pulse Ox


 


 99.3 F   93   18   164/66 H  98 


 


 09/29/17 07:25  09/29/17 08:00  09/29/17 08:00  09/29/17 08:00  09/29/17 08:00








 Intake & Output











 09/28/17 09/29/17 09/30/17





 06:59 06:59 06:59


 


Intake Total  1000 


 


Output Total  350 


 


Balance  650 


 


Weight  75.2 kg 











General appearance: PRESENT: no acute distress, cooperative, well-developed, 

well-nourished


Head exam: PRESENT: atraumatic, normocephalic


Eye exam: PRESENT: EOMI, PERRLA


Ear exam: PRESENT: normal external ear exam, TM's normal bilaterally


Mouth exam: PRESENT: moist, neck supple, tongue midline


Neck exam: PRESENT: full ROM


Respiratory exam: PRESENT: clear to auscultation jr, symmetrical


Cardiovascular exam: PRESENT: +S1, +S2


Pulses: PRESENT: +2 pedal pulses bilateral


GI/Abdominal exam: PRESENT: normal bowel sounds, soft


Rectal exam: PRESENT: deferred


Extremities exam: PRESENT: full ROM


Neurological exam: PRESENT: alert, oriented to person, oriented to place


Additional comments: 





mild left hemiparesis- Power 4+/5 on left upper and lower extremities; normal 

power on right upper and lower extremities.


Psychiatric exam: PRESENT: normal mood





Results


Laboratory Results: 


 





 09/29/17 07:11 





 09/29/17 07:11 





 











  09/29/17 09/29/17 09/29/17





  07:11 07:11 07:11


 


WBC  7.2  


 


RBC  4.14 L  


 


Hgb  11.5 L  


 


Hct  32.5 L  


 


MCV  78 L  


 


MCH  27.7  


 


MCHC  35.3  


 


RDW  16.1 H  


 


Plt Count  193  


 


Seg Neutrophils %  76.3  


 


Lymphocytes %  8.9 L  


 


Monocytes %  14.4 H  


 


Eosinophils %  0.1  


 


Basophils %  0.3  


 


Absolute Neutrophils  5.5  


 


Absolute Lymphocytes  0.6  


 


Absolute Monocytes  1.0  


 


Absolute Eosinophils  0.0  


 


Absolute Basophils  0.0  


 


Sodium   137.8 


 


Potassium   3.9 


 


Chloride   107 


 


Carbon Dioxide   22 


 


Anion Gap   9 


 


BUN   16 


 


Creatinine   0.82 


 


Est GFR ( Amer)   > 60 


 


Est GFR (Non-Af Amer)   > 60 


 


Glucose   152 H 


 


Calcium   8.9 


 


Total Bilirubin   0.7 


 


AST   26 


 


ALT   19 L 


 


Alkaline Phosphatase   74 


 


Total Protein   6.9 


 


Albumin   3.3 L 


 


Triglycerides   69 


 


Cholesterol   164.52 


 


LDL Cholesterol Direct   74 


 


VLDL Cholesterol   14.0 


 


HDL Cholesterol   60 


 


TSH    2.29








 











  09/29/17 09/29/17





  07:11 07:11


 


Creatine Kinase  479 H 


 


CK-MB (CK-2)   0.98


 


Troponin I   0.025











Impressions: 


 





Head CT  09/28/17 00:00


IMPRESSION:  CHRONIC CHANGES OF ATROPHY AND MICROVASCULAR ISCHEMIA.  NO ACUTE 

PROCESS.


EVIDENCE OF ACUTE STROKE: NO.


 








Chest X-Ray  09/28/17 19:41


IMPRESSION:  No significant interval change.  No acute changes.  Other findings 

as noted above


 














Assessment & Plan





- Diagnosis


(1) CVA (cerebral vascular accident)


Qualifiers: 


   CVA mechanism: unspecified   Qualified Code(s): I63.9 - Cerebral infarction, 

unspecified   


Is this a current diagnosis for this admission?: Yes   


Plan: 


Ct with oxygen by N/C at 2 L/Min to keep saturation>92%; Asa 325 mg qd po; 

Follow Up MRI head with contrast, Doppler carotid US and ECHO. Ct with low salt

, low cholesterol diet.\








(2) Diabetes mellitus type 2 in nonobese


Is this a current diagnosis for this admission?: Yes   


Plan: 


ct with Januvia 100 mg qd po; Metformin 1000 mg  BID po; accucheck qac,qhs with 

slidding scale with humalog insulin [facility], [facility] protocol, 1800 calorie ADA diet.








(3) Hypertension


Qualifiers: 


   Hypertension type: essential hypertension   Qualified Code(s): I10 - 

Essential (primary) hypertension   


Is this a current diagnosis for this admission?: Yes   


Plan: 


t with Lisinopril 20 mg qd po; Diltiazem  mg qd po; Clonidine 0.1mg q6h 

prn po; 2 g sodium diet.








(4) Hyperlipidemia


Qualifiers: 


   Hyperlipidemia type: mixed hyperlipidemia   Qualified Code(s): E78.2 - Mixed 

hyperlipidemia   


Is this a current diagnosis for this admission?: Yes   


Plan: 


Ct with Atorvastatin 20 mg qhs po; 200 mg cholesterol diet.








(5) Chronic diastolic (congestive) heart failure


Is this a current diagnosis for this admission?: Yes   


Plan: 


Ct with Lasix  20 mg qd po; KCL 10 MEQ qd po; strict input out/put chart; daily 

wt; monitor chemistries daily.








(6) CAD (coronary artery disease) of bypass graft


Is this a current diagnosis for this admission?: Yes   


Plan: 


Ct with  mg qd po; low salt diet/low cholesterol diet.








(7) Reflux esophagitis


Is this a current diagnosis for this admission?: Yes   


Plan: 


Ct with Prevacid 30 mg qd po since Omeprazole is not in our formulary.








(8) Constipation


Qualifiers: 


   Constipation type: unspecified constipation type   Qualified Code(s): K59.00 

- Constipation, unspecified   


Is this a current diagnosis for this admission?: Yes   


Plan: 


Ct with Colace 100 mg qd po prn








(9) Low back pain


Qualifiers: 


   Chronicity: acute   Back pain laterality: midline   Sciatica presence: 

without sciatica   Qualified Code(s): M54.5 - Low back pain   


Is this a current diagnosis for this admission?: Yes   


Plan: 


CT with Tylenol 650 mg q6h po prn.








(10) DVT prophylaxis


Is this a current diagnosis for this admission?: Yes   


Plan: 


Ct with Lovenox 40 mg qd subcut; SCD.








- Time


Time Spent: 30 to 50 Minutes


Medications reviewed and adjusted accordingly: Yes


Anticipated discharge: Home with Homehealth


Within: within 72 hours





- Inpatient Certification


Medical Necessity: Failure to Improve With Outpatient Therapy, Significant 

Comorbidiites Make Outpatient Treatment Too Risky, Need Close Monitoring Due to 

Risk of Patient Decompensation

## 2017-09-29 NOTE — XCELERA REPORT
[address] [address]

                             Tel: [phone]

                             Fax: [phone]



                    Transthoracic Echocardiogram Report

____________________________________________________________________________



Name: PAIGE RODRIGUEZ

MRN: [id number]                Age: 88 yrs

Gender: Male                   : 1929

Patient Status: Inpatient      Patient Location: [address] [place] [address]

Account #: [id number]

Study Date: 2017 03:26 PM

Accession #: [id number]

____________________________________________________________________________



Height: 67 in        Weight: 165 lb        BSA: 1.9 m2



____________________________________________________________________________

Procedure: A complete two-dimensional transthoracic echocardiogram was

performed (2D, M-mode, spectral and color flow Doppler). The study was

technically difficult with many images being suboptimal in quality.

Reason For Study: CVA





Ordering Physician: ERIKA JULES

Performed By: Diamond Gaspar

____________________________________________________________________________





Interpretation Summary

The study was technically difficult with many images being suboptimal in

quality.

The left ventricular ejection fraction is normal.

Doppler measurements suggest pseudonormalized left ventricular relaxation,

which is associated with grade II/IV or mild to moderate diastolic

dysfunction

There is borderline concentric left ventricular hypertrophy.

The left ventricle is grossly normal size.

Regional wall motion abnormalities cannot be excluded due to limited

visualization.

Borderline right ventricular enlargement.

The right atrium is mildly dilated.

The left atrium is mildly dilated.

There is a trace to mild amount of mitral regurgitation

There is no mitral valve stenosis.

There is a trace or physiologic amount of tricuspid regurgitation

Tricuspid regurgitation jet envelope not well defined to measure RV

systolic pressure accurately.

The inferior vena cava was not well visualized

The aortic root is not well visualized.

There is no pericardial effusion.

No definite cardiac source of CVA/TIA noted on this particular trans-

thoracic study.

Consider JING if clinically indicated.

May consider mobile cardiac telemetry monitoring (MCT) for ruling out

transient AFIB.



____________________________________________________________________________



MMode/2D Measurements & Calculations

RVDd: 2.9 cm       LVIDd: 4.3 cm FS: 35.9 %          Ao root diam: 3.2 cm

IVSd: 1.1 cm       LVIDs: 2.8 cm EDV(Teich): 85.1 ml

                   LVPWd: 1.1 cm ESV(Teich): 29.2 ml Ao root area: 7.9 cm2

                                 EF(Teich): 65.7 %   LA dimension: 3.9 cm



        _____________________________________________________________

LVOT diam: 2.2 cm

LVOT area: 3.8 cm2





Doppler Measurements & Calculations

MV E max jignesh:      MV P1/2t max jignesh:     Ao V2 max:       LV V1 max P.5 cm/sec        63.2 cm/sec           110.0 cm/sec     2.3 mmHg

MV A max jignesh:      MV P1/2t: 44.6 msec   Ao max PG:       LV V1 max:

104.5 cm/sec       MVA(P1/2t): 4.9 cm2   4.8 mmHg         76.5 cm/sec

MV E/A: 0.60       MV dec slope:         MICK(V,D): 2.6 cm2

                   415.7 cm/sec2



        _____________________________________________________________

PA V2 max:

57.6 cm/sec

PA max P.3 mmHg



____________________________________________________________________________

Left Ventricle

The left ventricle is grossly normal size. There is borderline concentric

left ventricular hypertrophy. The left ventricular ejection fraction is

normal. Doppler measurements suggest pseudonormalized left ventricular

relaxation, which is associated with grade II/IV or mild to moderate

diastolic dysfunction. Regional wall motion abnormalities cannot be

excluded due to limited visualization.



Right Ventricle

Borderline right ventricular enlargement. There is normal right ventricular

wall thickness. The right ventricular systolic function is normal.



Atria

The right atrium is mildly dilated. The left atrium is mildly dilated.

Interarterial septum not well visualized and not well dopplered. Cannot

comment on ASD/PFO presence.





Mitral Valve

There is mild to moderate mitral annular calcification. There is no mitral

valve stenosis. There is a trace to mild amount of mitral regurgitation.



Aortic Valve

The aortic valve is moderately calcified. There is no aortic valve

stenosis. There is a trace amount of aortic regurgitation.



Tricuspid Valve

The tricuspid valve is not well visualized, but is grossly normal. There is

no tricuspid stenosis. There is a trace or physiologic amount of tricuspid

regurgitation. Tricuspid regurgitation jet envelope not well defined to

measure RV systolic pressure accurately.



Pulmonic Valve

The pulmonic valve is not well visualized.



Great Vessels

The aortic root is not well visualized. The inferior vena cava was not well

visualized.





Effusions

There is no pericardial effusion.



Incidental Findings

No definite cardiac source of CVA/TIA noted on this particular trans-

thoracic study. Consider JING if clinically indicated. May consider mobile

cardiac telemetry monitoring (MCT) for ruling out transient AFIB.



____________________________________________________________________________



Electronically signed by:      Armond Lu      on 2017 10:52 PM



CC: ERIKA JULES Shyamal

## 2017-09-29 NOTE — EKG REPORT
SEVERITY:- ABNORMAL ECG -

SINUS RHYTHM

PAIRED VENTRICULAR PREMATURE COMPLEXES

RIGHT BUNDLE BRANCH BLOCK

:

Confirmed by: Armond Lu 29-Sep-2017 09:45:39

## 2017-09-29 NOTE — PDOC DISCHARGE SUMMARY
General





- Admit/Disc Date/PCP


Admission Date/Primary Care Provider: 


  09/29/17 00:05





  ERIKA JULES





Discharge Date: 09/29/17





- Discharge Diagnosis


(1) CVA (cerebral vascular accident)


Is this a current diagnosis for this admission?: Yes   





(2) Diabetes mellitus type 2 in nonobese


Is this a current diagnosis for this admission?: Yes   





(3) Hypertension


Is this a current diagnosis for this admission?: Yes   





(4) Hyperlipidemia


Is this a current diagnosis for this admission?: Yes   





(5) Chronic diastolic (congestive) heart failure


Is this a current diagnosis for this admission?: Yes   





(6) CAD (coronary artery disease) of bypass graft


Is this a current diagnosis for this admission?: Yes   





(7) Reflux esophagitis


Is this a current diagnosis for this admission?: Yes   





(8) Constipation


Is this a current diagnosis for this admission?: Yes   





(9) Low back pain


Is this a current diagnosis for this admission?: Yes   





(10) DVT prophylaxis


Is this a current diagnosis for this admission?: Yes   





- Additional Information


Resuscitation Status: Full Code


Home Medications: 








Atorvastatin Calcium [Lipitor 20 mg Tablet] 20 mg PO DAILY 02/17/17 


Diltiazem HCl [Diltiazem 24Hr Cd] 120 mg PO DAILY 02/17/17 


Dorzolamide HCl/Timolol Maleat [Dorzolamide-Timolol Eye Drops] 1 drop OU BID 02/ 17/17 


Furosemide [Lasix] 20 mg PO DAILY 02/17/17 


Quinapril HCl [Accupril 20 mg Tablet] 20 mg PO DAILY 02/17/17 


Aspirin [Aspirin 325 mg Tablet] 325 mg PO DAILY #30 tablet 09/29/17 


Metformin HCl [Glucophage 500 mg Tablet] 1,000 mg PO BIDACBS 09/29/17 


Potassium Chloride [Klor-Con 10 Meq Tablet.sa] 10 meq PO DAILY #30 tablet.sa 09/ 29/17 


Sitagliptin Phosphate [Januvia 50 mg Tablet] 100 mg PO DAILY  tablet 09/29/17 











History of Present Illness


History of Present Illness: 


PAIGE RODRIGUEZ is a 88 year old malewith hx of DM/HTN/Hyperlipidemia/CAD s.p  

stent/CABG/Chronic CHF- diastolic dysfunction/Vitamin D def/Backpain/

Constipation who was D/C from [facility] on 02/21/2017 for pneumonia and CHF 

decompensation.He had urologic procedure about 5 days ago by Dr Villagomez for 

elevated PSA. He started having left sided weakness with left facial drop and 

slurring of speech about 5 hours prior to arrival to ER and was  brought to ER 

by EMS.His BP was elevated at ED and had IV antihypertensives at ED.








Hospital Course


Hospital Course: 





88year old man who was admitted for possiible CVA with left hemiparesis. He was 

admitted at [facility]. He had negative CT head without contrast; MRI head with 

contrast was negative; Doppler US of carotid showe no stenosis; Had ECHO- 

report is pending. He is fine and back to his baseline. We will discharge him 

home to follow up with pCP on 10/04/2017.





Physical Exam


Vital Signs: 


 











Temp Pulse Resp BP Pulse Ox


 


 99.3 F   83   20   160/60 H  98 


 


 09/29/17 07:25  09/29/17 16:00  09/29/17 16:00  09/29/17 16:00  09/29/17 16:00








 Intake & Output











 09/28/17 09/29/17 09/30/17





 06:59 06:59 06:59


 


Intake Total  1000 


 


Output Total  350 


 


Balance  650 


 


Weight  75.2 kg 











General appearance: PRESENT: no acute distress, cooperative


Head exam: PRESENT: atraumatic, normocephalic


Eye exam: PRESENT: EOMI


Ear exam: PRESENT: normal external ear exam


Mouth exam: PRESENT: moist, neck supple


Respiratory exam: PRESENT: clear to auscultation jr, symmetrical


Cardiovascular exam: PRESENT: +S1, +S2


Pulses: PRESENT: +2 pedal pulses bilateral


GI/Abdominal exam: PRESENT: normal bowel sounds, soft


Rectal exam: PRESENT: deferred


Extremities exam: PRESENT: full ROM


Neurological exam: PRESENT: alert, awake, oriented to person, oriented to place

, oriented to time


Psychiatric exam: PRESENT: normal mood





Results


Laboratory Results: 


 





 09/29/17 07:11 





 09/29/17 07:11 





 











  09/29/17 09/29/17 09/29/17





  07:11 07:11 07:11


 


WBC  7.2  


 


RBC  4.14 L  


 


Hgb  11.5 L  


 


Hct  32.5 L  


 


MCV  78 L  


 


MCH  27.7  


 


MCHC  35.3  


 


RDW  16.1 H  


 


Plt Count  193  


 


Seg Neutrophils %  76.3  


 


Lymphocytes %  8.9 L  


 


Monocytes %  14.4 H  


 


Eosinophils %  0.1  


 


Basophils %  0.3  


 


Absolute Neutrophils  5.5  


 


Absolute Lymphocytes  0.6  


 


Absolute Monocytes  1.0  


 


Absolute Eosinophils  0.0  


 


Absolute Basophils  0.0  


 


Sodium   137.8 


 


Potassium   3.9 


 


Chloride   107 


 


Carbon Dioxide   22 


 


Anion Gap   9 


 


BUN   16 


 


Creatinine   0.82 


 


Est GFR ( Amer)   > 60 


 


Est GFR (Non-Af Amer)   > 60 


 


Glucose   152 H 


 


Calcium   8.9 


 


Total Bilirubin   0.7 


 


AST   26 


 


ALT   19 L 


 


Alkaline Phosphatase   74 


 


Total Protein   6.9 


 


Albumin   3.3 L 


 


Triglycerides   69 


 


Cholesterol   164.52 


 


LDL Cholesterol Direct   74 


 


VLDL Cholesterol   14.0 


 


HDL Cholesterol   60 


 


TSH    2.29








 











  09/29/17 09/29/17 09/29/17





  07:11 07:11 14:13


 


Creatine Kinase  479 H   501 H


 


CK-MB (CK-2)   0.98 


 


Troponin I   0.025 














  09/29/17





  14:13


 


Creatine Kinase 


 


CK-MB (CK-2)  0.82


 


Troponin I  0.016











Impressions: 


 





Head CT  09/28/17 00:00


IMPRESSION:  CHRONIC CHANGES OF ATROPHY AND MICROVASCULAR ISCHEMIA.  NO ACUTE 

PROCESS.


EVIDENCE OF ACUTE STROKE: NO.


 








Chest X-Ray  09/28/17 19:41


IMPRESSION:  No significant interval change.  No acute changes.  Other findings 

as noted above


 








Carotid Doppler Study  09/29/17 00:00


IMPRESSION:  NO HEMODYNAMICALLY SIGNIFICANT STENOSIS.


 








Head MRI  09/29/17 00:00


IMPRESSION:  Old right frontal infarct.  Extensive white matter disease.  No 

acute findings.


EVIDENCE OF ACUTE STROKE: NO.

## 2017-09-29 NOTE — RADIOLOGY REPORT (SQ)
EXAM DESCRIPTION:  CAROTID DOPPLER



COMPLETED DATE/TIME:  9/29/2017 3:59 pm



REASON FOR STUDY:  CVA



COMPARISON:  None.



TECHNIQUE:  Grayscale ultrasound, Doppler velocity and spectra, and color Doppler images acquired of 
the extra-cranial carotid and vertebral arteries. Images stored on PACS.



LIMITATIONS:  None.



FINDINGS:  RIGHT CAROTID

CCA Velocities: Within normal limits.

ICA Velocities

 Peak systolic 0.62 m/s.

 End diastolic 0.17 m/s.

Proximal ICA/CCA peak systolic ratio 0.7.

Spectra normal. No significant plaque.

LEFT CAROTID

CCA Velocities: Within normal limits.

ICA Velocities

 Peak systolic 0.77 m/s.

 End diastolic 0.25 m/s.

Proximal ICA/CCA peak systolic ratio 0.7.

Spectra normal. No significant plaque.

VERTEBRAL ARTERIES: Antegrade flow.  Normal waveforms.

SUBCLAVIAN ARTERIES: No finding.

OTHER: No other significant finding.



IMPRESSION:  NO HEMODYNAMICALLY SIGNIFICANT STENOSIS.



COMMENT:  Quality ID #195:  Velocity criteria are extrapolated from the diameter data as defined by t
he Society of Radiologists in Ultrasound Consensus Conference. Radiology 2003: 229; 340-346.



TECHNICAL DOCUMENTATION:  JOB ID:  [id number]

 2011 [employer]- All Rights Reserved

## 2017-09-29 NOTE — EKG REPORT
SEVERITY:- ABNORMAL ECG -

SINUS TACHYCARDIA

VENTRICULAR PREMATURE COMPLEX

RIGHT BUNDLE BRANCH BLOCK

:

Confirmed by: Ayse Her MD 29-Sep-2017 03:54:26

## 2017-09-29 NOTE — RADIOLOGY REPORT (SQ)
EXAM DESCRIPTION:  MRI HEAD COMBO



COMPLETED DATE/TIME:  9/29/2017 12:25 pm



REASON FOR STUDY:  CVA



COMPARISON:  CT brain 9/28/2017



TECHNIQUE:  Multiplanar imaging includes noncontrasted T1, T2, FLAIR, diffusion with ADC map and post
gadolinium contrast T1 sequences. Images stored on PACS.



CONTRAST TYPE AND DOSE:  15 mL Multihance.



RENAL FUNCTION:  GFR > 60.



LIMITATIONS:  None.



FINDINGS:  ANATOMY: Benign anatomic variant, cavum septum pellucidum. Normal vascular flow voids. Pit
uitary fossa normal.

CSF SPACES: Normal in size and contour. No hemorrhage.

CEREBRUM: Old cortical and subcortical white matter infarcts are present in the anterior right fronta
l lobe, posterior right frontal lobe.

Extensive small vessel chronic ischemic change in the bifrontal and biparietal white matter.

No MR evidence of acute ischemic change.  No acute intracranial hemorrhage, mass effect, or midline s
hift.

POSTERIOR FOSSA: No signal alteration. No hemorrhage. No edema, masses, or mass effect. Internal jael
tory canals, cerebellopontine angles, mastoids normal. No enhancing lesions. No abnormal enhancement 
post contrast.

DIFFUSION IMAGING: Negative for acute or subacute infarction.

ORBITS: No masses. Globes post cataract surgery.

PARANASAL SINUSES: No fluid levels. Mucosa normal.

OTHER: No other significant finding.



IMPRESSION:  Old right frontal infarct.  Extensive white matter disease.  No acute findings.

EVIDENCE OF ACUTE STROKE: NO.



TECHNICAL DOCUMENTATION:  JOB ID:  [id number]

 [employer]- All Rights Reserved

## 2018-02-16 ENCOUNTER — HOSPITAL ENCOUNTER (OUTPATIENT)
Dept: HOSPITAL 62 - OD | Age: 83
End: 2018-02-16
Attending: INTERNAL MEDICINE
Payer: MEDICARE

## 2018-02-16 DIAGNOSIS — Z13.29: ICD-10-CM

## 2018-02-16 DIAGNOSIS — E55.9: Primary | ICD-10-CM

## 2018-02-16 LAB
FREE T4 (FREE THYROXINE): 1.26 NG/DL (ref 0.78–2.19)
TSH SERPL-ACNC: 3.25 UIU/ML (ref 0.47–4.68)

## 2018-02-16 PROCEDURE — 36415 COLL VENOUS BLD VENIPUNCTURE: CPT

## 2018-02-16 PROCEDURE — 84443 ASSAY THYROID STIM HORMONE: CPT

## 2018-02-16 PROCEDURE — 84439 ASSAY OF FREE THYROXINE: CPT

## 2018-02-16 PROCEDURE — 82306 VITAMIN D 25 HYDROXY: CPT

## 2018-08-13 ENCOUNTER — HOSPITAL ENCOUNTER (OUTPATIENT)
Dept: HOSPITAL 62 - OD | Age: 83
End: 2018-08-13
Attending: INTERNAL MEDICINE
Payer: MEDICARE

## 2018-08-13 DIAGNOSIS — R97.20: ICD-10-CM

## 2018-08-13 DIAGNOSIS — E11.65: ICD-10-CM

## 2018-08-13 DIAGNOSIS — E55.9: ICD-10-CM

## 2018-08-13 DIAGNOSIS — E78.2: Primary | ICD-10-CM

## 2018-08-13 LAB
ADD MANUAL DIFF: NO
ALBUMIN SERPL-MCNC: 4.7 G/DL (ref 3.5–5)
ALP SERPL-CCNC: 89 U/L (ref 38–126)
ALT SERPL-CCNC: 20 U/L (ref 21–72)
ANION GAP SERPL CALC-SCNC: 18 MMOL/L (ref 5–19)
AST SERPL-CCNC: 22 U/L (ref 17–59)
BASOPHILS # BLD AUTO: 0.1 10^3/UL (ref 0–0.2)
BASOPHILS NFR BLD AUTO: 0.6 % (ref 0–2)
BILIRUB DIRECT SERPL-MCNC: 0.4 MG/DL (ref 0–0.4)
BILIRUB SERPL-MCNC: 0.7 MG/DL (ref 0.2–1.3)
BUN SERPL-MCNC: 22 MG/DL (ref 7–20)
CALCIUM: 10.3 MG/DL (ref 8.4–10.2)
CHLORIDE SERPL-SCNC: 104 MMOL/L (ref 98–107)
CO2 SERPL-SCNC: 24 MMOL/L (ref 22–30)
EOSINOPHIL # BLD AUTO: 0.1 10^3/UL (ref 0–0.6)
EOSINOPHIL NFR BLD AUTO: 1 % (ref 0–6)
ERYTHROCYTE [DISTWIDTH] IN BLOOD BY AUTOMATED COUNT: 16.4 % (ref 11.5–14)
GLUCOSE SERPL-MCNC: 134 MG/DL (ref 75–110)
HCT VFR BLD CALC: 34.8 % (ref 37.9–51)
HGB BLD-MCNC: 11.5 G/DL (ref 13.5–17)
LDLC SERPL DIRECT ASSAY-MCNC: 66 MG/DL (ref ?–100)
LYMPHOCYTES # BLD AUTO: 2.1 10^3/UL (ref 0.5–4.7)
LYMPHOCYTES NFR BLD AUTO: 25.5 % (ref 13–45)
MCH RBC QN AUTO: 26.2 PG (ref 27–33.4)
MCHC RBC AUTO-ENTMCNC: 32.9 G/DL (ref 32–36)
MCV RBC AUTO: 80 FL (ref 80–97)
MONOCYTES # BLD AUTO: 0.6 10^3/UL (ref 0.1–1.4)
MONOCYTES NFR BLD AUTO: 7.6 % (ref 3–13)
NEUTROPHILS # BLD AUTO: 5.3 10^3/UL (ref 1.7–8.2)
NEUTS SEG NFR BLD AUTO: 65.3 % (ref 42–78)
PLATELET # BLD: 226 10^3/UL (ref 150–450)
POTASSIUM SERPL-SCNC: 4.8 MMOL/L (ref 3.6–5)
PROT SERPL-MCNC: 9.2 G/DL (ref 6.3–8.2)
RBC # BLD AUTO: 4.38 10^6/UL (ref 4.35–5.55)
SODIUM SERPL-SCNC: 146.2 MMOL/L (ref 137–145)
TOTAL CELLS COUNTED % (AUTO): 100 %
TRIGL SERPL-MCNC: 116 MG/DL (ref ?–150)
VLDLC SERPL CALC-MCNC: 23 MG/DL (ref 10–31)
WBC # BLD AUTO: 8.1 10^3/UL (ref 4–10.5)

## 2018-08-13 PROCEDURE — 36415 COLL VENOUS BLD VENIPUNCTURE: CPT

## 2018-08-13 PROCEDURE — 84154 ASSAY OF PSA FREE: CPT

## 2018-08-13 PROCEDURE — 82570 ASSAY OF URINE CREATININE: CPT

## 2018-08-13 PROCEDURE — 82043 UR ALBUMIN QUANTITATIVE: CPT

## 2018-08-13 PROCEDURE — 85025 COMPLETE CBC W/AUTO DIFF WBC: CPT

## 2018-08-13 PROCEDURE — 83036 HEMOGLOBIN GLYCOSYLATED A1C: CPT

## 2018-08-13 PROCEDURE — 80053 COMPREHEN METABOLIC PANEL: CPT

## 2018-08-13 PROCEDURE — 82306 VITAMIN D 25 HYDROXY: CPT

## 2018-08-13 PROCEDURE — 80061 LIPID PANEL: CPT
